# Patient Record
Sex: MALE | Race: WHITE | NOT HISPANIC OR LATINO | URBAN - METROPOLITAN AREA
[De-identification: names, ages, dates, MRNs, and addresses within clinical notes are randomized per-mention and may not be internally consistent; named-entity substitution may affect disease eponyms.]

---

## 2018-03-23 ENCOUNTER — ON CAMPUS - OUTPATIENT (OUTPATIENT)
Dept: URBAN - METROPOLITAN AREA HOSPITAL 28 | Facility: HOSPITAL | Age: 78
End: 2018-03-23

## 2018-03-23 DIAGNOSIS — K63.5 POLYP OF COLON: ICD-10-CM

## 2018-03-23 DIAGNOSIS — K57.90 DIVERTICULOSIS OF INTESTINE, PART UNSPECIFIED, WITHOUT PERFO: ICD-10-CM

## 2018-03-23 DIAGNOSIS — Z86.010 PERSONAL HISTORY OF COLONIC POLYPS: ICD-10-CM

## 2018-03-23 PROCEDURE — 45380 COLONOSCOPY AND BIOPSY: CPT | Mod: PT

## 2019-03-15 ENCOUNTER — OFFICE (OUTPATIENT)
Dept: URBAN - METROPOLITAN AREA CLINIC 75 | Facility: CLINIC | Age: 79
End: 2019-03-15

## 2019-03-15 VITALS
WEIGHT: 232 LBS | HEART RATE: 83 BPM | DIASTOLIC BLOOD PRESSURE: 78 MMHG | HEIGHT: 70 IN | SYSTOLIC BLOOD PRESSURE: 120 MMHG

## 2019-03-15 DIAGNOSIS — K21.9 GASTRO-ESOPHAGEAL REFLUX DISEASE WITHOUT ESOPHAGITIS: ICD-10-CM

## 2019-03-15 PROCEDURE — 99213 OFFICE O/P EST LOW 20 MIN: CPT | Performed by: INTERNAL MEDICINE

## 2024-11-07 ENCOUNTER — TELEPHONE (OUTPATIENT)
Dept: INTERNAL MEDICINE | Facility: CLINIC | Age: 84
End: 2024-11-07

## 2024-11-07 RX ORDER — LIDOCAINE AND PRILOCAINE 25; 25 MG/G; MG/G
CREAM TOPICAL AS NEEDED
Qty: 1 EACH | Refills: 3 | Status: SHIPPED | OUTPATIENT
Start: 2024-11-07

## 2024-11-07 NOTE — TELEPHONE ENCOUNTER
I will refill this. He has an appt w/ Pain Management on 11/11 and I would suggest that he discuss this with them further as well as this pain seems to be lingering longer than would typically be expected from Shingles.

## 2024-11-07 NOTE — TELEPHONE ENCOUNTER
Caller: Jose Reynoso    Relationship: Self    Best call back number: [phone]    What medication are you requesting: PATIENT WENT TO ER HAS SHINGLES WANTED TO SEE IF MAURY GONSALEZ WOULD CALL HIM IN THIS MEDICATION FOR THE PAIN FROM THE SHINGLES   lidocaine-prilocaine (EMLA) 2.5-2.5 % cream       PATIENT IS ABOUT TO RUN OUT OF CREAM AND NEEDS BEFORE THE WEEKEND     If a prescription is needed, what is your preferred pharmacy and phone number:  [facility] [place] - [address] [facility] [phone] [facility] [phone]

## 2024-12-11 ENCOUNTER — OFFICE VISIT (OUTPATIENT)
Dept: PAIN MEDICINE | Facility: CLINIC | Age: 84
End: 2024-12-11
Payer: MEDICARE

## 2024-12-11 VITALS
HEIGHT: 71 IN | WEIGHT: 207.8 LBS | BODY MASS INDEX: 29.09 KG/M2 | DIASTOLIC BLOOD PRESSURE: 80 MMHG | OXYGEN SATURATION: 95 % | HEART RATE: 71 BPM | SYSTOLIC BLOOD PRESSURE: 110 MMHG | TEMPERATURE: 95.7 F

## 2024-12-11 DIAGNOSIS — M25.551 RIGHT HIP PAIN: ICD-10-CM

## 2024-12-11 DIAGNOSIS — M96.1 POSTLAMINECTOMY SYNDROME, CERVICAL REGION: ICD-10-CM

## 2024-12-11 DIAGNOSIS — B02.29 POST HERPETIC NEURALGIA: ICD-10-CM

## 2024-12-11 DIAGNOSIS — M54.16 LUMBAR RADICULOPATHY: ICD-10-CM

## 2024-12-11 DIAGNOSIS — G89.3 CANCER RELATED PAIN: ICD-10-CM

## 2024-12-11 DIAGNOSIS — M96.1 POSTLAMINECTOMY SYNDROME, CERVICAL REGION: Primary | ICD-10-CM

## 2024-12-11 DIAGNOSIS — Z79.899 ENCOUNTER FOR LONG-TERM (CURRENT) USE OF HIGH-RISK MEDICATION: ICD-10-CM

## 2024-12-11 LAB
POC AMPHETAMINES: NEGATIVE
POC BARBITURATES: NEGATIVE
POC BENZODIAZEPHINES: NEGATIVE
POC COCAINE: NEGATIVE
POC METHADONE: NEGATIVE
POC METHAMPHETAMINE SCREEN URINE: NEGATIVE
POC OPIATES: NEGATIVE
POC OXYCODONE: NEGATIVE
POC PHENCYCLIDINE: NEGATIVE
POC PROPOXYPHENE: NEGATIVE
POC THC: NEGATIVE
POC TRICYCLIC ANTIDEPRESSANTS: NEGATIVE

## 2024-12-11 RX ORDER — OXYCODONE AND ACETAMINOPHEN 10; 325 MG/1; MG/1
1 TABLET ORAL EVERY 4 HOURS PRN
Qty: 180 TABLET | Refills: 0 | Status: SHIPPED | OUTPATIENT
Start: 2024-12-11
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
## 2025-01-13 ENCOUNTER — TRANSCRIBE ORDERS (OUTPATIENT)
Dept: SURGERY | Facility: SURGERY CENTER | Age: 85
End: 2025-01-13
Payer: MEDICARE

## 2025-01-13 ENCOUNTER — OFFICE VISIT (OUTPATIENT)
Dept: PAIN MEDICINE | Facility: CLINIC | Age: 85
End: 2025-01-13
Payer: MEDICARE

## 2025-01-13 ENCOUNTER — PREP FOR SURGERY (OUTPATIENT)
Dept: SURGERY | Facility: SURGERY CENTER | Age: 85
End: 2025-01-13
Payer: MEDICARE

## 2025-01-13 VITALS
DIASTOLIC BLOOD PRESSURE: 74 MMHG | HEIGHT: 71 IN | WEIGHT: 208 LBS | BODY MASS INDEX: 29.12 KG/M2 | OXYGEN SATURATION: 94 % | TEMPERATURE: 97.7 F | HEART RATE: 63 BPM | RESPIRATION RATE: 20 BRPM | SYSTOLIC BLOOD PRESSURE: 136 MMHG

## 2025-01-13 DIAGNOSIS — M19.049 ARTHRITIS PAIN OF HAND: ICD-10-CM

## 2025-01-13 DIAGNOSIS — G89.3 CANCER RELATED PAIN: ICD-10-CM

## 2025-01-13 DIAGNOSIS — G89.3 CANCER RELATED PAIN: Primary | ICD-10-CM

## 2025-01-13 DIAGNOSIS — M54.16 LUMBAR RADICULOPATHY: ICD-10-CM

## 2025-01-13 DIAGNOSIS — M96.1 POSTLAMINECTOMY SYNDROME, CERVICAL REGION: ICD-10-CM

## 2025-01-13 DIAGNOSIS — M79.2 NEUROPATHIC PAIN OF CHEST: ICD-10-CM

## 2025-01-13 DIAGNOSIS — Z79.899 ENCOUNTER FOR LONG-TERM (CURRENT) USE OF HIGH-RISK MEDICATION: ICD-10-CM

## 2025-01-13 DIAGNOSIS — B02.29 POST HERPETIC NEURALGIA: ICD-10-CM

## 2025-01-13 DIAGNOSIS — M25.551 RIGHT HIP PAIN: Primary | ICD-10-CM

## 2025-01-13 DIAGNOSIS — M25.551 RIGHT HIP PAIN: ICD-10-CM

## 2025-01-13 DIAGNOSIS — Z41.9 SURGERY, ELECTIVE: Primary | ICD-10-CM

## 2025-01-13 PROCEDURE — 1125F AMNT PAIN NOTED PAIN PRSNT: CPT | Performed by: NURSE PRACTITIONER

## 2025-01-13 PROCEDURE — 99214 OFFICE O/P EST MOD 30 MIN: CPT | Performed by: NURSE PRACTITIONER

## 2025-01-13 PROCEDURE — 1160F RVW MEDS BY RX/DR IN RCRD: CPT | Performed by: NURSE PRACTITIONER

## 2025-01-13 PROCEDURE — 1159F MED LIST DOCD IN RCRD: CPT | Performed by: NURSE PRACTITIONER

## 2025-01-13 RX ORDER — OXYCODONE AND ACETAMINOPHEN 10; 325 MG/1; MG/1
1 TABLET ORAL EVERY 4 HOURS PRN
Qty: 180 TABLET | Refills: 0 | Status: SHIPPED | OUTPATIENT
Start: 2025-01-13
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
## 2025-01-28 ENCOUNTER — HOSPITAL ENCOUNTER (OUTPATIENT)
Facility: SURGERY CENTER | Age: 85
Setting detail: HOSPITAL OUTPATIENT SURGERY
Discharge: HOME | End: 2025-01-28
Attending: ANESTHESIOLOGY | Admitting: ANESTHESIOLOGY
Payer: MEDICARE

## 2025-01-28 ENCOUNTER — HOSPITAL ENCOUNTER (OUTPATIENT)
Dept: GENERAL RADIOLOGY | Facility: SURGERY CENTER | Age: 85
Setting detail: HOSPITAL OUTPATIENT SURGERY
End: 2025-01-28
Payer: MEDICARE

## 2025-01-28 VITALS
RESPIRATION RATE: 16 BRPM | SYSTOLIC BLOOD PRESSURE: 117 MMHG | TEMPERATURE: 97.3 F | HEART RATE: 59 BPM | BODY MASS INDEX: 28.63 KG/M2 | OXYGEN SATURATION: 95 % | DIASTOLIC BLOOD PRESSURE: 74 MMHG | HEIGHT: 70 IN | WEIGHT: 200 LBS

## 2025-01-28 DIAGNOSIS — Z41.9 SURGERY, ELECTIVE: ICD-10-CM

## 2025-01-28 PROCEDURE — 25010000002 LIDOCAINE PF 1% 1 % SOLUTION 5 ML VIAL: Performed by: ANESTHESIOLOGY

## 2025-01-28 PROCEDURE — 77002 NEEDLE LOCALIZATION BY XRAY: CPT

## 2025-01-28 PROCEDURE — 25510000001 IOPAMIDOL 61 % SOLUTION 30 ML VIAL: Performed by: ANESTHESIOLOGY

## 2025-01-28 PROCEDURE — 25010000002 METHYLPREDNISOLONE PER 80 MG: Performed by: ANESTHESIOLOGY

## 2025-01-28 PROCEDURE — 20610 DRAIN/INJ JOINT/BURSA W/O US: CPT | Performed by: ANESTHESIOLOGY

## 2025-01-28 PROCEDURE — 25010000002 BUPIVACAINE (PF) 0.5 % SOLUTION 10 ML VIAL: Performed by: ANESTHESIOLOGY

## 2025-01-28 PROCEDURE — 77002 NEEDLE LOCALIZATION BY XRAY: CPT | Performed by: ANESTHESIOLOGY

## 2025-01-28 NOTE — OP NOTE
Right Hip injection - lateral approach  [facility]    PREOPERATIVE DIAGNOSIS:     right hip DJD    POSTOPERATIVE DIAGNOSIS:   same    PROCEDURE:  20610 - Right Intra-articular Hip Joint Injection, with fluoroscopic guidance & hip arthrogram - Lateral Approach    PRE-PROCEDURE DISCUSSION WITH PATIENT:    Risks and complications were discussed with the patient prior to starting the procedure (including but not limited to infection, bleeding, injury, paralysis, and death) and informed consent was obtained.      SURGEON:  Reid Jackson MD    REASON FOR PROCEDURE:    previous 100% relief x 3 months.  Repeat is reasonable    SEDATION:  no  ANESTHETIC AGENT:  Marcaine 0.5%  STEROID AGENT:   depomedrol 80mg    DESCRIPTON OF PROCEDURE:  After obtaining informed consent, IV access was obtained in the preoperative area.  The patient was transported to the operative suite and placed in lateral decubitus position with the affected hip up.  The hip and knee were flexed.  EKG, blood pressure, and pulse oximetry were monitored.  Any and all sedation given was administered by the RN under my direct supervision and guidance.   The hip area was prepped in a wide diameter with Chloraprep and draped in a sterile fashion.     A point just cephalad of the greater trochanter was identified and the point was anesthetized with subcutaneous 1% Lidocaine.  A  22-gauge spinal needle was inserted perpendicular to the skin and, under fluoroscopic guidance and strict aseptic technique, directed over the greater trochanter toward the head of the femur and to a point near the medial neck of the femur and just lateral to the head of the femur.  Aspiration was confirmed negative.  After confirming the position of the needle with fluoroscopy, 1 mL of Omnipaque was injected and after seeing appropriate spread into the joint a total of 4mL of injectate including the above listed local anesthetic and steroid was injected into the  joint.  Vital signs remained stable.  The onset of analgesia was noted.    ESTIMATED BLOOD LOSS:  minimal  SPECIMENS:  None    COMPLICATIONS:  no    TOLERANCE & DISCHARGE CONDITION:    The patient tolerated the procedure well.  The patient was transported to the recovery area without difficulties.  The patient was discharged to home under the care of a chaperone and in satisfactory condition.    PLAN OF CARE:  The patient was given our standard instruction sheet.  The patient will return for regular office visit in 2 weeks.   The patient is asked to keep a pain log sheet hourly for 8 hours today.  The patient will resume all medications as per the medication reconciliation sheet.

## 2025-01-28 NOTE — DISCHARGE INSTRUCTIONS
[facility] - Post-procedure Instructions          --  While there are no absolute restrictions, it is recommended that you do not perform strenuous activity today. In the morning, you may resume your level of activity as before your block.    --  If you have a band-aid at your injection site, please remove it later today. Observe the area for any redness, swelling, pus-like drainage, or a temperature over 101°. If any of these symptoms occur, please call your doctor at [phone]. If after office hours, leave a message and the on-call provider will return your call.    --  Ice may be applied to your injection site. It is recommended you avoid direct heat (heating pad; hot tub) for 1-2 days.    --  Call [facility] at [phone] if you experience persistent headache, persistent bleeding from the injection site, or severe pain not relieved by heat or oral medication.    --  Do not make important decisions today.    --  Due to the effects of the block and/or the I.V. Sedation, DO NOT drive or operate hazardous machinery for 12 hours.  Local anesthetics may cause numbness after procedure and precautions must be taken with regards to operating equipment as well as with walking, even if ambulating with assistance of another person or with an assistive device.    --  Do not drink alcohol for 12 hours.    -- You may return to work tomorrow, or as directed by your referring doctor.    --  Occasionally you may notice a slight increase in your pain after the procedure. This should start to improve within the next 24-48 hours. Radiofrequency ablation procedure pain may last 3-4 weeks.    --  It may take as long as 3-4 days before you notice a gradual improvement in your pain and/or other symptoms.    -- You may continue to take your prescribed pain medication as needed.    --  Some normal possible side effects of steroid use could include fluid retention, increased blood sugar, dull headache,  increased sweating, increased appetite, mood swings and flushing.    --  Diabetics are recommended to watch their blood glucose level closely for 24-48 hours after the injection.    --  Must stay in PACU for 20 min upon arrival and prove no leg weakness before being discharged.    --  IN THE EVENT OF A LIFE THREATENING EMERGENCY, (CHEST PAIN, BREATHING DIFFICULTIES, PARALYSIS…) YOU SHOULD GO TO YOUR NEAREST EMERGENCY ROOM.    --  You should be contacted by our office within 2-3 days to schedule follow up or next appointment date.  If not contacted within 7 days, please call the office at [phone]

## 2025-02-10 ENCOUNTER — OFFICE VISIT (OUTPATIENT)
Dept: PAIN MEDICINE | Facility: CLINIC | Age: 85
End: 2025-02-10
Payer: MEDICARE

## 2025-02-10 VITALS
OXYGEN SATURATION: 93 % | BODY MASS INDEX: 29.84 KG/M2 | TEMPERATURE: 96.3 F | DIASTOLIC BLOOD PRESSURE: 74 MMHG | RESPIRATION RATE: 20 BRPM | WEIGHT: 208.4 LBS | SYSTOLIC BLOOD PRESSURE: 119 MMHG | HEART RATE: 62 BPM | HEIGHT: 70 IN

## 2025-02-10 DIAGNOSIS — B02.29 POST HERPETIC NEURALGIA: ICD-10-CM

## 2025-02-10 DIAGNOSIS — G89.3 CANCER RELATED PAIN: ICD-10-CM

## 2025-02-10 DIAGNOSIS — Z79.899 ENCOUNTER FOR LONG-TERM (CURRENT) USE OF HIGH-RISK MEDICATION: ICD-10-CM

## 2025-02-10 DIAGNOSIS — M54.16 LUMBAR RADICULOPATHY: ICD-10-CM

## 2025-02-10 DIAGNOSIS — M96.1 POSTLAMINECTOMY SYNDROME, CERVICAL REGION: ICD-10-CM

## 2025-02-10 DIAGNOSIS — M25.551 RIGHT HIP PAIN: ICD-10-CM

## 2025-02-10 DIAGNOSIS — M96.1 POSTLAMINECTOMY SYNDROME, CERVICAL REGION: Primary | ICD-10-CM

## 2025-02-10 PROCEDURE — 1160F RVW MEDS BY RX/DR IN RCRD: CPT | Performed by: NURSE PRACTITIONER

## 2025-02-10 PROCEDURE — 1159F MED LIST DOCD IN RCRD: CPT | Performed by: NURSE PRACTITIONER

## 2025-02-10 PROCEDURE — 1125F AMNT PAIN NOTED PAIN PRSNT: CPT | Performed by: NURSE PRACTITIONER

## 2025-02-10 PROCEDURE — 99214 OFFICE O/P EST MOD 30 MIN: CPT | Performed by: NURSE PRACTITIONER

## 2025-02-10 RX ORDER — OXYCODONE AND ACETAMINOPHEN 10; 325 MG/1; MG/1
1 TABLET ORAL EVERY 4 HOURS PRN
Qty: 180 TABLET | Refills: 0 | Status: SHIPPED | OUTPATIENT
Start: 2025-02-10
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
## 2025-02-19 ENCOUNTER — APPOINTMENT (OUTPATIENT)
Dept: GENERAL RADIOLOGY | Facility: HOSPITAL | Age: 85
End: 2025-02-19
Payer: MEDICARE

## 2025-02-19 ENCOUNTER — ANESTHESIA (OUTPATIENT)
Dept: PERIOP | Facility: HOSPITAL | Age: 85
End: 2025-02-19
Payer: MEDICARE

## 2025-02-19 ENCOUNTER — HOSPITAL ENCOUNTER (OUTPATIENT)
Facility: HOSPITAL | Age: 85
Discharge: HOME HEALTH | End: 2025-02-21
Attending: STUDENT IN AN ORGANIZED HEALTH CARE EDUCATION/TRAINING PROGRAM | Admitting: HOSPITALIST
Payer: MEDICARE

## 2025-02-19 ENCOUNTER — ANESTHESIA EVENT (OUTPATIENT)
Dept: PERIOP | Facility: HOSPITAL | Age: 85
End: 2025-02-19
Payer: MEDICARE

## 2025-02-19 DIAGNOSIS — S82.851A CLOSED TRIMALLEOLAR FRACTURE OF RIGHT ANKLE, INITIAL ENCOUNTER: Primary | ICD-10-CM

## 2025-02-19 PROBLEM — S82.853A TRIMALLEOLAR FRACTURE: Status: ACTIVE | Noted: 2025-02-19

## 2025-02-19 LAB
ALBUMIN SERPL-MCNC: 4.4 G/DL (ref 3.5–5.2)
ALBUMIN/GLOB SERPL: 1.6 G/DL
ALP SERPL-CCNC: 62 U/L (ref 39–117)
ALT SERPL W P-5'-P-CCNC: 14 U/L (ref 1–41)
ANION GAP SERPL CALCULATED.3IONS-SCNC: 10.4 MMOL/L (ref 5–15)
AST SERPL-CCNC: 23 U/L (ref 1–40)
BASOPHILS # BLD AUTO: 0.02 10*3/MM3 (ref 0–0.2)
BASOPHILS NFR BLD AUTO: 0.3 % (ref 0–1.5)
BILIRUB SERPL-MCNC: 0.7 MG/DL (ref 0–1.2)
BUN SERPL-MCNC: 18 MG/DL (ref 8–23)
BUN/CREAT SERPL: 20.9 (ref 7–25)
CALCIUM SPEC-SCNC: 8.9 MG/DL (ref 8.6–10.5)
CHLORIDE SERPL-SCNC: 104 MMOL/L (ref 98–107)
CO2 SERPL-SCNC: 25.6 MMOL/L (ref 22–29)
CREAT SERPL-MCNC: 0.86 MG/DL (ref 0.76–1.27)
DEPRECATED RDW RBC AUTO: 41.7 FL (ref 37–54)
EGFRCR SERPLBLD CKD-EPI 2021: 86.5 ML/MIN/1.73
EOSINOPHIL # BLD AUTO: 0.14 10*3/MM3 (ref 0–0.4)
EOSINOPHIL NFR BLD AUTO: 1.9 % (ref 0.3–6.2)
ERYTHROCYTE [DISTWIDTH] IN BLOOD BY AUTOMATED COUNT: 11.6 % (ref 12.3–15.4)
GLOBULIN UR ELPH-MCNC: 2.8 GM/DL
GLUCOSE SERPL-MCNC: 104 MG/DL (ref 65–99)
HCT VFR BLD AUTO: 42.8 % (ref 37.5–51)
HGB BLD-MCNC: 13.8 G/DL (ref 13–17.7)
IMM GRANULOCYTES # BLD AUTO: 0.04 10*3/MM3 (ref 0–0.05)
IMM GRANULOCYTES NFR BLD AUTO: 0.5 % (ref 0–0.5)
INR PPP: 0.87 (ref 0.9–1.1)
LYMPHOCYTES # BLD AUTO: 1.06 10*3/MM3 (ref 0.7–3.1)
LYMPHOCYTES NFR BLD AUTO: 14.6 % (ref 19.6–45.3)
MCH RBC QN AUTO: 30.9 PG (ref 26.6–33)
MCHC RBC AUTO-ENTMCNC: 32.2 G/DL (ref 31.5–35.7)
MCV RBC AUTO: 95.7 FL (ref 79–97)
MONOCYTES # BLD AUTO: 0.73 10*3/MM3 (ref 0.1–0.9)
MONOCYTES NFR BLD AUTO: 10 % (ref 5–12)
NEUTROPHILS NFR BLD AUTO: 5.29 10*3/MM3 (ref 1.7–7)
NEUTROPHILS NFR BLD AUTO: 72.7 % (ref 42.7–76)
PLATELET # BLD AUTO: 199 10*3/MM3 (ref 140–450)
PMV BLD AUTO: 9.7 FL (ref 6–12)
POTASSIUM SERPL-SCNC: 4 MMOL/L (ref 3.5–5.2)
POTASSIUM SERPL-SCNC: 4.4 MMOL/L (ref 3.5–5.2)
PROT SERPL-MCNC: 7.2 G/DL (ref 6–8.5)
PROTHROMBIN TIME: 12.2 SECONDS (ref 12.1–15)
RBC # BLD AUTO: 4.47 10*6/MM3 (ref 4.14–5.8)
SODIUM SERPL-SCNC: 140 MMOL/L (ref 136–145)
WBC NRBC COR # BLD AUTO: 7.28 10*3/MM3 (ref 3.4–10.8)

## 2025-02-19 PROCEDURE — 84132 ASSAY OF SERUM POTASSIUM: CPT | Performed by: STUDENT IN AN ORGANIZED HEALTH CARE EDUCATION/TRAINING PROGRAM

## 2025-02-19 PROCEDURE — 63710000001 POVIDONE-IODINE 10 % SOLUTION 118 ML BOTTLE: Performed by: INTERNAL MEDICINE

## 2025-02-19 PROCEDURE — C1713 ANCHOR/SCREW BN/BN,TIS/BN: HCPCS | Performed by: INTERNAL MEDICINE

## 2025-02-19 PROCEDURE — 76000 FLUOROSCOPY <1 HR PHYS/QHP: CPT

## 2025-02-19 PROCEDURE — 36415 COLL VENOUS BLD VENIPUNCTURE: CPT

## 2025-02-19 PROCEDURE — 25010000002 LIDOCAINE 2% SOLUTION: Performed by: ANESTHESIOLOGY

## 2025-02-19 PROCEDURE — 80053 COMPREHEN METABOLIC PANEL: CPT | Performed by: STUDENT IN AN ORGANIZED HEALTH CARE EDUCATION/TRAINING PROGRAM

## 2025-02-19 PROCEDURE — 99222 1ST HOSP IP/OBS MODERATE 55: CPT | Performed by: HOSPITALIST

## 2025-02-19 PROCEDURE — 99285 EMERGENCY DEPT VISIT HI MDM: CPT | Performed by: STUDENT IN AN ORGANIZED HEALTH CARE EDUCATION/TRAINING PROGRAM

## 2025-02-19 PROCEDURE — 25010000002 BUPIVACAINE (PF) 0.5 % SOLUTION: Performed by: ANESTHESIOLOGY

## 2025-02-19 PROCEDURE — 93005 ELECTROCARDIOGRAM TRACING: CPT | Performed by: STUDENT IN AN ORGANIZED HEALTH CARE EDUCATION/TRAINING PROGRAM

## 2025-02-19 PROCEDURE — 25810000003 LACTATED RINGERS PER 1000 ML: Performed by: ANESTHESIOLOGY

## 2025-02-19 PROCEDURE — 85025 COMPLETE CBC W/AUTO DIFF WBC: CPT | Performed by: STUDENT IN AN ORGANIZED HEALTH CARE EDUCATION/TRAINING PROGRAM

## 2025-02-19 PROCEDURE — 27822 TREATMENT OF ANKLE FRACTURE: CPT | Performed by: INTERNAL MEDICINE

## 2025-02-19 PROCEDURE — 25010000002 CEFAZOLIN PER 500 MG: Performed by: INTERNAL MEDICINE

## 2025-02-19 PROCEDURE — 25010000002 HYDROMORPHONE 1 MG/ML SOLUTION: Performed by: STUDENT IN AN ORGANIZED HEALTH CARE EDUCATION/TRAINING PROGRAM

## 2025-02-19 PROCEDURE — 73610 X-RAY EXAM OF ANKLE: CPT

## 2025-02-19 PROCEDURE — 25010000002 PROPOFOL 200 MG/20ML EMULSION: Performed by: ANESTHESIOLOGY

## 2025-02-19 PROCEDURE — 25010000002 ONDANSETRON PER 1 MG: Performed by: ANESTHESIOLOGY

## 2025-02-19 PROCEDURE — 85610 PROTHROMBIN TIME: CPT | Performed by: STUDENT IN AN ORGANIZED HEALTH CARE EDUCATION/TRAINING PROGRAM

## 2025-02-19 PROCEDURE — 25010000002 FENTANYL CITRATE (PF) 50 MCG/ML SOLUTION: Performed by: ANESTHESIOLOGY

## 2025-02-19 PROCEDURE — 27829 TREAT LOWER LEG JOINT: CPT | Performed by: INTERNAL MEDICINE

## 2025-02-19 PROCEDURE — 93010 ELECTROCARDIOGRAM REPORT: CPT | Performed by: INTERNAL MEDICINE

## 2025-02-19 PROCEDURE — A9270 NON-COVERED ITEM OR SERVICE: HCPCS | Performed by: INTERNAL MEDICINE

## 2025-02-19 PROCEDURE — 99204 OFFICE O/P NEW MOD 45 MIN: CPT | Performed by: INTERNAL MEDICINE

## 2025-02-19 PROCEDURE — 96374 THER/PROPH/DIAG INJ IV PUSH: CPT

## 2025-02-19 DEVICE — IMPLANTABLE DEVICE
Type: IMPLANTABLE DEVICE | Site: ANKLE | Status: FUNCTIONAL
Brand: ZIPTIGHT FIXATION SYSTEM

## 2025-02-19 DEVICE — SCRW PERIART LK HD2.7MM 3.5X16MM: Type: IMPLANTABLE DEVICE | Site: ANKLE | Status: FUNCTIONAL

## 2025-02-19 DEVICE — SCRW ULS LK 2.7X16MM: Type: IMPLANTABLE DEVICE | Site: ANKLE | Status: FUNCTIONAL

## 2025-02-19 DEVICE — PLT FIB PERIART LK D/L 6H 106 RT: Type: IMPLANTABLE DEVICE | Site: ANKLE | Status: FUNCTIONAL

## 2025-02-19 DEVICE — MEDLINE BONEWAX YELLOW
Type: IMPLANTABLE DEVICE | Site: ANKLE | Status: FUNCTIONAL
Brand: MEDLINE BONEWAX YELLOW

## 2025-02-19 DEVICE — SCRW CORT PERIART LP S/TAP HD2.7  3.5X20: Type: IMPLANTABLE DEVICE | Site: ANKLE | Status: FUNCTIONAL

## 2025-02-19 DEVICE — SCRW ULS LK 2.7X18MM: Type: IMPLANTABLE DEVICE | Site: ANKLE | Status: FUNCTIONAL

## 2025-02-19 DEVICE — SCRW ULS LK PT S/TAP 2.7X14MM: Type: IMPLANTABLE DEVICE | Site: ANKLE | Status: FUNCTIONAL

## 2025-02-19 DEVICE — IMPLANTABLE DEVICE: Type: IMPLANTABLE DEVICE | Site: ANKLE | Status: FUNCTIONAL

## 2025-02-19 RX ORDER — HYDROCODONE BITARTRATE AND ACETAMINOPHEN 5; 325 MG/1; MG/1
1 TABLET ORAL EVERY 4 HOURS PRN
Status: DISCONTINUED | OUTPATIENT
Start: 2025-02-19 | End: 2025-02-20

## 2025-02-19 RX ORDER — FLUTICASONE PROPIONATE 50 MCG
2 SPRAY, SUSPENSION (ML) NASAL DAILY
Status: DISCONTINUED | OUTPATIENT
Start: 2025-02-19 | End: 2025-02-21 | Stop reason: HOSPADM

## 2025-02-19 RX ORDER — OXYCODONE HYDROCHLORIDE 5 MG/1
5 TABLET ORAL ONCE
Status: COMPLETED | OUTPATIENT
Start: 2025-02-19 | End: 2025-02-19

## 2025-02-19 RX ORDER — LIDOCAINE AND PRILOCAINE 25; 25 MG/G; MG/G
CREAM TOPICAL AS NEEDED
Status: DISCONTINUED | OUTPATIENT
Start: 2025-02-19 | End: 2025-02-21 | Stop reason: HOSPADM

## 2025-02-19 RX ORDER — ONDANSETRON 2 MG/ML
4 INJECTION INTRAMUSCULAR; INTRAVENOUS ONCE
Status: COMPLETED | OUTPATIENT
Start: 2025-02-19 | End: 2025-02-19

## 2025-02-19 RX ORDER — SODIUM CHLORIDE 0.9 % (FLUSH) 0.9 %
10 SYRINGE (ML) INJECTION AS NEEDED
Status: DISCONTINUED | OUTPATIENT
Start: 2025-02-19 | End: 2025-02-19 | Stop reason: HOSPADM

## 2025-02-19 RX ORDER — MIDAZOLAM HYDROCHLORIDE 2 MG/2ML
0.5 INJECTION, SOLUTION INTRAMUSCULAR; INTRAVENOUS
Status: DISCONTINUED | OUTPATIENT
Start: 2025-02-19 | End: 2025-02-19 | Stop reason: HOSPADM

## 2025-02-19 RX ORDER — TAMSULOSIN HYDROCHLORIDE 0.4 MG/1
0.4 CAPSULE ORAL NIGHTLY
Status: DISCONTINUED | OUTPATIENT
Start: 2025-02-19 | End: 2025-02-21 | Stop reason: HOSPADM

## 2025-02-19 RX ORDER — PROPOFOL 10 MG/ML
INJECTION, EMULSION INTRAVENOUS CONTINUOUS PRN
Status: DISCONTINUED | OUTPATIENT
Start: 2025-02-19 | End: 2025-02-19 | Stop reason: SURG

## 2025-02-19 RX ORDER — DEXMEDETOMIDINE HYDROCHLORIDE 100 UG/ML
INJECTION, SOLUTION INTRAVENOUS
Status: COMPLETED | OUTPATIENT
Start: 2025-02-19 | End: 2025-02-19

## 2025-02-19 RX ORDER — BUPIVACAINE HYDROCHLORIDE 5 MG/ML
INJECTION, SOLUTION EPIDURAL; INTRACAUDAL
Status: COMPLETED | OUTPATIENT
Start: 2025-02-19 | End: 2025-02-19

## 2025-02-19 RX ORDER — FINASTERIDE 5 MG/1
5 TABLET, FILM COATED ORAL DAILY
Status: DISCONTINUED | OUTPATIENT
Start: 2025-02-20 | End: 2025-02-21 | Stop reason: HOSPADM

## 2025-02-19 RX ORDER — FAMOTIDINE 10 MG/ML
20 INJECTION, SOLUTION INTRAVENOUS
Status: COMPLETED | OUTPATIENT
Start: 2025-02-19 | End: 2025-02-19

## 2025-02-19 RX ORDER — ONDANSETRON 2 MG/ML
4 INJECTION INTRAMUSCULAR; INTRAVENOUS ONCE AS NEEDED
Status: DISCONTINUED | OUTPATIENT
Start: 2025-02-19 | End: 2025-02-19 | Stop reason: HOSPADM

## 2025-02-19 RX ORDER — ACETAMINOPHEN 500 MG
1000 TABLET ORAL ONCE
Status: COMPLETED | OUTPATIENT
Start: 2025-02-19 | End: 2025-02-19

## 2025-02-19 RX ORDER — FENTANYL CITRATE 50 UG/ML
50 INJECTION, SOLUTION INTRAMUSCULAR; INTRAVENOUS
Status: DISCONTINUED | OUTPATIENT
Start: 2025-02-19 | End: 2025-02-19 | Stop reason: HOSPADM

## 2025-02-19 RX ORDER — ALBUTEROL SULFATE 0.83 MG/ML
1.25 SOLUTION RESPIRATORY (INHALATION) EVERY 4 HOURS PRN
Status: DISCONTINUED | OUTPATIENT
Start: 2025-02-19 | End: 2025-02-21 | Stop reason: HOSPADM

## 2025-02-19 RX ORDER — GUAIFENESIN 600 MG/1
600 TABLET, EXTENDED RELEASE ORAL 2 TIMES DAILY
Status: DISCONTINUED | OUTPATIENT
Start: 2025-02-19 | End: 2025-02-21 | Stop reason: HOSPADM

## 2025-02-19 RX ORDER — ASPIRIN 81 MG/1
81 TABLET ORAL EVERY 12 HOURS SCHEDULED
Status: DISCONTINUED | OUTPATIENT
Start: 2025-02-20 | End: 2025-02-21 | Stop reason: HOSPADM

## 2025-02-19 RX ORDER — ARFORMOTEROL TARTRATE 15 UG/2ML
15 SOLUTION RESPIRATORY (INHALATION)
Status: DISCONTINUED | OUTPATIENT
Start: 2025-02-19 | End: 2025-02-21 | Stop reason: HOSPADM

## 2025-02-19 RX ORDER — SODIUM CHLORIDE, SODIUM LACTATE, POTASSIUM CHLORIDE, CALCIUM CHLORIDE 600; 310; 30; 20 MG/100ML; MG/100ML; MG/100ML; MG/100ML
100 INJECTION, SOLUTION INTRAVENOUS ONCE
Status: COMPLETED | OUTPATIENT
Start: 2025-02-19 | End: 2025-02-19

## 2025-02-19 RX ORDER — HYDROCODONE BITARTRATE AND ACETAMINOPHEN 10; 325 MG/1; MG/1
1 TABLET ORAL EVERY 4 HOURS PRN
Status: DISCONTINUED | OUTPATIENT
Start: 2025-02-19 | End: 2025-02-20

## 2025-02-19 RX ORDER — PANTOPRAZOLE SODIUM 40 MG/1
40 TABLET, DELAYED RELEASE ORAL
Status: DISCONTINUED | OUTPATIENT
Start: 2025-02-20 | End: 2025-02-21 | Stop reason: HOSPADM

## 2025-02-19 RX ORDER — LIDOCAINE HYDROCHLORIDE 20 MG/ML
INJECTION, SOLUTION INFILTRATION; PERINEURAL AS NEEDED
Status: DISCONTINUED | OUTPATIENT
Start: 2025-02-19 | End: 2025-02-19 | Stop reason: SURG

## 2025-02-19 RX ORDER — FENTANYL CITRATE 50 UG/ML
25 INJECTION, SOLUTION INTRAMUSCULAR; INTRAVENOUS
Status: DISCONTINUED | OUTPATIENT
Start: 2025-02-19 | End: 2025-02-19 | Stop reason: HOSPADM

## 2025-02-19 RX ORDER — MAGNESIUM HYDROXIDE 1200 MG/15ML
LIQUID ORAL AS NEEDED
Status: DISCONTINUED | OUTPATIENT
Start: 2025-02-19 | End: 2025-02-19 | Stop reason: HOSPADM

## 2025-02-19 RX ORDER — EPHEDRINE SULFATE 50 MG/ML
INJECTION INTRAVENOUS AS NEEDED
Status: DISCONTINUED | OUTPATIENT
Start: 2025-02-19 | End: 2025-02-19 | Stop reason: SURG

## 2025-02-19 RX ORDER — FENTANYL CITRATE 50 UG/ML
25 INJECTION, SOLUTION INTRAMUSCULAR; INTRAVENOUS
Status: COMPLETED | OUTPATIENT
Start: 2025-02-19 | End: 2025-02-19

## 2025-02-19 RX ORDER — SODIUM CHLORIDE 9 MG/ML
40 INJECTION, SOLUTION INTRAVENOUS AS NEEDED
Status: DISCONTINUED | OUTPATIENT
Start: 2025-02-19 | End: 2025-02-19 | Stop reason: HOSPADM

## 2025-02-19 RX ORDER — MORPHINE SULFATE 2 MG/ML
2 INJECTION, SOLUTION INTRAMUSCULAR; INTRAVENOUS EVERY 4 HOURS PRN
Status: DISCONTINUED | OUTPATIENT
Start: 2025-02-19 | End: 2025-02-21

## 2025-02-19 RX ORDER — NALOXONE HCL 0.4 MG/ML
0.4 VIAL (ML) INJECTION
Status: DISCONTINUED | OUTPATIENT
Start: 2025-02-19 | End: 2025-02-21 | Stop reason: HOSPADM

## 2025-02-19 RX ORDER — SODIUM CHLORIDE 0.9 % (FLUSH) 0.9 %
10 SYRINGE (ML) INJECTION EVERY 12 HOURS SCHEDULED
Status: DISCONTINUED | OUTPATIENT
Start: 2025-02-19 | End: 2025-02-19 | Stop reason: HOSPADM

## 2025-02-19 RX ORDER — LIDOCAINE 4 G/G
1 PATCH TOPICAL EVERY 24 HOURS
Status: DISCONTINUED | OUTPATIENT
Start: 2025-02-19 | End: 2025-02-21 | Stop reason: HOSPADM

## 2025-02-19 RX ADMIN — FAMOTIDINE 20 MG: 10 INJECTION INTRAVENOUS at 19:34

## 2025-02-19 RX ADMIN — BUPIVACAINE HYDROCHLORIDE 17 ML: 5 INJECTION, SOLUTION EPIDURAL; INTRACAUDAL at 21:22

## 2025-02-19 RX ADMIN — EPHEDRINE SULFATE 10 MG: 50 INJECTION INTRAVENOUS at 22:17

## 2025-02-19 RX ADMIN — EPHEDRINE SULFATE 10 MG: 50 INJECTION INTRAVENOUS at 21:51

## 2025-02-19 RX ADMIN — ACETAMINOPHEN 1000 MG: 500 TABLET, FILM COATED ORAL at 15:38

## 2025-02-19 RX ADMIN — PROPOFOL 20 MG: 10 INJECTION, EMULSION INTRAVENOUS at 21:39

## 2025-02-19 RX ADMIN — DEXMEDETOMIDINE HYDROCHLORIDE 20 MCG: 100 INJECTION, SOLUTION INTRAVENOUS at 21:22

## 2025-02-19 RX ADMIN — ONDANSETRON 4 MG: 2 INJECTION INTRAMUSCULAR; INTRAVENOUS at 19:34

## 2025-02-19 RX ADMIN — LIDOCAINE HYDROCHLORIDE 60 MG: 20 INJECTION, SOLUTION INFILTRATION; PERINEURAL at 21:38

## 2025-02-19 RX ADMIN — DEXMEDETOMIDINE 20 MCG: 100 INJECTION, SOLUTION, CONCENTRATE INTRAVENOUS at 21:16

## 2025-02-19 RX ADMIN — FENTANYL CITRATE 25 MCG: 50 INJECTION, SOLUTION INTRAMUSCULAR; INTRAVENOUS at 20:12

## 2025-02-19 RX ADMIN — OXYCODONE HYDROCHLORIDE 5 MG: 5 TABLET ORAL at 15:38

## 2025-02-19 RX ADMIN — HYDROMORPHONE HYDROCHLORIDE 0.5 MG: 1 INJECTION, SOLUTION INTRAMUSCULAR; INTRAVENOUS; SUBCUTANEOUS at 16:57

## 2025-02-19 RX ADMIN — SODIUM CHLORIDE, POTASSIUM CHLORIDE, SODIUM LACTATE AND CALCIUM CHLORIDE: 600; 310; 30; 20 INJECTION, SOLUTION INTRAVENOUS at 21:31

## 2025-02-19 RX ADMIN — FENTANYL CITRATE 25 MCG: 50 INJECTION, SOLUTION INTRAMUSCULAR; INTRAVENOUS at 20:23

## 2025-02-19 RX ADMIN — BUPIVACAINE HYDROCHLORIDE 5 ML: 5 INJECTION, SOLUTION EPIDURAL; INTRACAUDAL; PERINEURAL at 21:16

## 2025-02-19 RX ADMIN — CEFAZOLIN 2000 MG: 2 INJECTION, POWDER, FOR SOLUTION INTRAVENOUS at 21:35

## 2025-02-19 RX ADMIN — PROPOFOL 60 MCG/KG/MIN: 10 INJECTION, EMULSION INTRAVENOUS at 21:38

## 2025-02-19 NOTE — OP NOTE
Date of Operation: 02/19/25        PREOPERATIVE DIAGNOSIS: right displaced trimalleolar ankle fracture dislocation     POSTOPERATIVE DIAGNOSIS:right displaced trimalleolar ankle fracture dislocation with syndesmotic disruption      PROCEDURE PERFORMED:  1.  Open treatment of trimalleolar ankle fracture includes internal fixation performed medial and/or lateral malleolus without fixation of posterior lip 78499.  2.  Open treatment of distal tibiofibular joint disruption includes internal fixation when performed [id number]     SURGEON: Ernie Ying MD     ASSISTANT:  Assistant: Ivan Denson CSA was responsible for performing the following activities: Retraction, Suction, Suturing, Closing, and Placing Dressing and their skilled assistance was necessary for the success of this case.      ANESTHESIA:general with block     ESTIMATED BLOOD LOSS: minimal     URINE OUTPUT: Not recorded.       FLUIDS: Per anesthesia.       COMPLICATIONS: None.       SPECIMENS: None.       DRAINS: None.       IMPLANTS: amie 6 hole lateral anatomic plate 2 50 mm amie partially-threaded cancellous screws, amie tightrope 1      INDICATIONS FOR PROCEDURE: The patient is a pleasant 84-year-old male who sustained a right displaced trimalleolar ankle fracture dislocation yesterday when he fell.  I discussed treatment options available to the patient including closed treatment versus open reduction, internal fixation, and patient wished to proceed with surgical treatment given displacement of the fracture site, as well as concerns for nonunion. I explained details of the procedure, as well as the risks, benefits, and alternatives as documented on history and physical, and the patient had all questions answered prior to signing the operative consent form. No guarantees were given in regard to results of the surgery.       DESCRIPTION OF PROCEDURE: The patient was seen, evaluated, and cleared for surgery by anesthesia. Admitted in the preoperative  holding area. The operative site was marked, consent was reviewed, history and physical was updated, and preoperative labs were reviewed. A regional block was then placed per anesthesia. The patient was then taken to the operating room and placed in a supine position on a regular OR table. After successful intubation per anesthesia, nonsterile tourniquet was applied to operative extremity.  All bony prominence is well-padded and patient was secured to the table with a waist strap. The left lower extremity was then sterilely prepped and draped in a standard fashion.       A formal timeout was completed, including confirmation of History and Physical, operative consent, surgical site, patient identification number, and preoperative antibiotic administration. The left lower extremity was exsanguinated using an Esmarch and the tourniquet was inflated to 250 mmHG.       The procedure was begun with a 3 cm incision centered over the medial malleolus.  The skin was incised with a 15 blade and then blunt dissection was carried down to the fracture with careful attention to avoid the saphenous vein.  The fracture was identified the ends were curetted and the periosteum was debrided out of the fracture site.  The joint was irrigated.  The fracture was held in place with a dental pick and 2 K wires were then placed in a retrograde fashion from the tip of the medial malleolus into the tibial metaphysis.  Two 4.0 mm partially-threaded cancellous screws were then placed over the wires and sequentially tightened.     We then turned our attention to the lateral aspect of the left distal fibula with a 8 cm centered over the fracture site with a #15 blade. Careful dissection was carried through the subcutaneous tissue with Metzenbaum scissors until the periosteal layer of the fibula was identified at this point in time.  The fibula fracture site was identified at this point time and tentative reduction obtained with pointed reduction  and lobster-claw clamps.   A 6-hole anatomic distal fibula locking plate was chosen and applied to the lateral aspect of the distal fibula in a bridging technique, with position confirmed to be appropriate under C-arm fluoroscopy as well as acceptable reduction of the fracture. One cortical screw was placed in bicortical fashion proximal to the fracture site. The plate was clamped to the distal portion of the distal fibula, and an additional cortical screw was placed distal to the fracture site. Additional locking screws were placed both proximal and distal to the fracture site at this point in time.  Reduction and stability at the fracture site were once again assessed under direct visualization as well as under stress with gentle range of motion of ankle. Final fluoroscopic images were taken at this point in time, confirming acceptable reduction and placement of hardware. Stress examination of syndesmosis under fluoroscopy was also completed this point in time with evidence of widening of syndesmosis.  The syndesmosis was clamped with a large periarticular clamp and a medline syndex tight rope was opened on the back table and the K wire shuttle was passed through the plate through the fibula into the anterior medial tibia.  The tight rope was then shuttled through using the K wire shuttle and then tightned.  The periarticular clamp was then removed.  During fracture fixation was noted that there was a posterior lip fracture that was considerably less than 25% of the articular surface and because of this we elected to treat it nonoperatively.  The tourniquet was then deflated and hemostasis was obtained and confirmed.  The wound was then copiously irrigated with normal saline.     Attention was then turned to closure of the wound with 2-0 monocryl for subcutaneous closure, 3-0 nylon horizontal mattresses for skin closure. The wounds were dressed with zeroform, 4 x 4 gauze, ABD pad, web roll 4 x 30 and 3 x 35  splint, and Ace wrap.        At the end of the procedure, all lap, needle, and sponge counts were correct x2. The patient had brisk capillary refill to all digits of the left lower extremity. Compartments were soft and easily compressible at the end of the procedure.       DISPOSITION: The patient was extubated per anesthesia and taken to the recovery room in stable condition. Patient will be sent back to the floor for observation overnight, plan for discharge today.  Patient will be nonweightbearing on operative extremity until follow-up visit, dressing and cam boot to remain in place until follow-up visit. Will follow up in office in 2 weeks for wound check. Results discussed immediately after procedure with family and all questions were answered postoperatively

## 2025-02-19 NOTE — H&P
"[facility] HOSPITALIST     Veronica Jerome, YAMILE    CHIEF COMPLAINT: Fall with ankle fracture    HISTORY OF PRESENT ILLNESS:    Mr. Reynoso is a very pleasant, 84-year-old gentleman who presents after sustaining a fall earlier today.  He reports he was going down to feed the birds when he slipped and went down on his right leg.  He said he heard a \"pop\" and a \"crack\".  He was unable to get back up and actually crawled to the snow, and into his garage where he banged on the door to get his wife's attention.  His wife looked out the window, and could not see him.  He then called for her at which point she opened the door and saw him on the ground.  EMS was summoned.  He reports he can go up and down 2 flights of stairs without any chest pain or dyspnea.      Past Medical History:   Diagnosis Date    Cancer     Cervical disc disorder     Chronic pain disorder     Neck, back, chest, legs    Colon polyp     COPD (chronic obstructive pulmonary disease)     Extremity pain     Fractures 1983    Back    GERD (gastroesophageal reflux disease)     Headache     Injury of back     Joint pain Hip    Low back pain     Lumbosacral disc disease     Neck pain     Peripheral neuropathy     Shingles      Past Surgical History:   Procedure Laterality Date    APPENDECTOMY      CERVICAL FUSION      CHOLECYSTECTOMY      COLONOSCOPY N/A 03/23/2018    Procedure: COLONOSCOPY with polypectomy;  Surgeon: Ja Lopez MD;  Location: [facility] OR;  Service: Gastroenterology    COLONOSCOPY W/ POLYPECTOMY N/A 05/01/2023    Procedure: COLONOSCOPY WITH POLYPECTOMY;  Surgeon: Ja Lopez MD;  Location: [facility] OR;  Service: Gastroenterology;  Laterality: N/A;  Diverticulosis; Sigmoid polyp x 4; Rectal polyp x 2    ENDOSCOPY N/A 07/05/2019    Procedure: ESOPHAGOGASTRODUODENOSCOPY w/ biopsies;  Surgeon: Ja Lopez MD;  Location: [facility] OR;  Service: Gastroenterology    EPIDURAL BLOCK      " LAMINECTOMY      NECK SURGERY      SPINAL FUSION      SPINE SURGERY      STEROID INJECTION HIP Right 2024    Procedure: right hip injection ;  Surgeon: Reid Jackson MD;  Location: [facility];  Service: Pain Management;  Laterality: Right;    STEROID INJECTION HIP Right 2025    Procedure: right hip injection ;  Surgeon: Reid Jackson MD;  Location: [facility];  Service: Pain Management;  Laterality: Right;    THORACIC EPIDURAL N/A 2024    Procedure: thoracic epidural steroid injection at approximately T7/8 50746;  Surgeon: Reid Jackson MD;  Location: [facility];  Service: Pain Management;  Laterality: N/A;     Family History   Problem Relation Age of Onset    Colon cancer Neg Hx     Colon polyps Neg Hx      Social History     Tobacco Use    Smoking status: Former     Current packs/day: 0.00     Average packs/day: 1 pack/day for 36.0 years (36.0 ttl pk-yrs)     Types: Cigarettes     Start date: 1951     Quit date: 1987     Years since quittin.1     Passive exposure: Past    Smokeless tobacco: Never   Vaping Use    Vaping status: Never Used   Substance Use Topics    Alcohol use: No    Drug use: No     Medications Prior to Admission   Medication Sig Dispense Refill Last Dose/Taking    albuterol (ACCUNEB) 1.25 MG/3ML nebulizer solution Take 3 mL by nebulization Every 4 (Four) Hours As Needed for Wheezing.   2025    alfuzosin (UROXATRAL) 10 MG 24 hr tablet Take 1 tablet by mouth Daily.   2025    Calcium-Phosphorus-Vitamin D (CITRACAL +D3 PO) Take  by mouth.   2025    finasteride (PROSCAR) 5 MG tablet Take 1 tablet by mouth Daily.   2025    guaiFENesin (MUCINEX) 600 MG 12 hr tablet Take 1 tablet by mouth 2 (Two) Times a Day.   2025    lidocaine (LIDODERM) 5 % Place 1 patch on the skin as directed by provider Daily. Remove & Discard patch within 12 hours or as directed by MD   Past Week    lidocaine-prilocaine (EMLA) 2.5-2.5 % cream  "Apply  topically to the appropriate area as directed As Needed for Mild Pain. 1 each 3 2/18/2025    Magnesium 300 MG capsule Take 250 mg by mouth 2 (Two) Times a Day.   2/19/2025    omeprazole (priLOSEC) 40 MG capsule 2 (Two) Times a Day.   2/19/2025    oxyCODONE-acetaminophen (PERCOCET)  MG per tablet Take 1 tablet by mouth Every 4 (Four) Hours As Needed for Moderate Pain. Fill date 2/13/2025 180 tablet 0 Past Week    polyethylene glycol (MIRALAX) 17 GM/SCOOP powder Take 17 g by mouth Daily. 238 g 0 2/19/2025    tiotropium bromide-olodaterol (STIOLTO RESPIMAT) 2.5-2.5 MCG/ACT aerosol solution inhaler Inhale 2 puffs Daily.   2/19/2025    ALBUTEROL IN Inhale.       clotrimazole-betamethasone (LOTRISONE) 1-0.05 % cream Apply 1 application topically to the appropriate area as directed 2 (Two) Times a Day. 45 g 0     fluticasone (FLONASE) 50 MCG/ACT nasal spray 2 sprays into the nostril(s) as directed by provider Daily. 15 mL 11 More than a month    mupirocin (BACTROBAN) 2 % ointment        naloxone (NARCAN) 4 MG/0.1ML nasal spray 1 spray into the nostril(s) as directed by provider As Needed (in the event of respiratory depression, use as soon as possible then call 911). 2 each 1      Allergies:  Penicillins, Adhesive tape, and Morphine    REVIEW OF SYSTEMS:  Please see the above history of present illness for pertinent positives and negatives.  The remainder of the patient's systems have been reviewed and are negative.    Vital Signs  Temp:  [98 °F (36.7 °C)] 98 °F (36.7 °C)  Heart Rate:  [59-70] 60  Resp:  [18] 18  BP: (121)/(86) 121/86  Oxygen Therapy  SpO2: 96 %  Pulse Oximetry Type: Intermittent  Device (Oxygen Therapy): room air}  Body mass index is 31.57 kg/m².  Flowsheet Rows      Flowsheet Row First Filed Value   Admission Height 177.8 cm (70\") Documented at 02/19/2025 1509   Admission Weight 99.8 kg (220 lb) Documented at 02/19/2025 1509               Physical Exam:  Physical Exam   Constitutional: " Patient appears well developed and well nourished and in no acute distress.  Appears younger than stated age.   HEENT:   Head: Normocephalic and atraumatic.   Eyes:  Pupils are equal, round, and EOM are intact. Sclerae are anicteric and noninjected.  Mouth and Throat: Patient has moist mucous membranes. Oropharynx is clear of any erythema or exudate.     Cardiovascular: Regular rate, regular rhythm, S1 normal and S2 normal.  Exam reveals no gallop and no friction rub.  No murmur heard.  Pulmonary/Chest: Lungs are clear to auscultation bilaterally. No respiratory distress. No wheezes. No rhonchi. No rales.   Abdominal: Soft. Bowel sounds are normal. No distension and no mass. There is no tenderness.   Musculoskeletal: Normal muscle tone  Extremities: No edema.   Neurological: Patient is alert and oriented to person, place, and time. Cranial nerves II-XII are grossly intact with no focal deficits.  Skin: Skin is warm. No rash noted. Nails show no clubbing.  No cyanosis or erythema.    Emotional Behavior:    Judgment and Insight: Normal   Mental Status:  Alertness  Normal   Memory:  Intact   Mood and Affect:         Depression  None               Anxiety  None    Debilities:   Physical Weakness  None   Handicaps  None   Disabilities  None   Agitation  None     Results Review:    I reviewed the patient's new clinical results.  Lab Results (most recent)       Procedure Component Value Units Date/Time    Potassium [[id number]]  (Normal) Collected: 02/19/25 1749    Specimen: Blood Updated: 02/19/25 1805     Potassium 4.0 mmol/L     Comprehensive Metabolic Panel [[id number]]  (Abnormal) Collected: 02/19/25 1657    Specimen: Blood Updated: 02/19/25 1722     Glucose 104 mg/dL      BUN 18 mg/dL      Creatinine 0.86 mg/dL      Sodium 140 mmol/L      Potassium 4.4 mmol/L      Comment: Slight hemolysis detected by analyzer. Result may be falsely elevated.        Chloride 104 mmol/L      CO2 25.6 mmol/L      Calcium 8.9 mg/dL       Total Protein 7.2 g/dL      Albumin 4.4 g/dL      ALT (SGPT) 14 U/L      AST (SGOT) 23 U/L      Alkaline Phosphatase 62 U/L      Total Bilirubin 0.7 mg/dL      Globulin 2.8 gm/dL      A/G Ratio 1.6 g/dL      BUN/Creatinine Ratio 20.9     Anion Gap 10.4 mmol/L      eGFR 86.5 mL/min/1.73     Narrative:      GFR Categories in Chronic Kidney Disease (CKD)      GFR Category          GFR (mL/min/1.73)    Interpretation  G1                     90 or greater         Normal or high (1)  G2                      60-89                Mild decrease (1)  G3a                   45-59                Mild to moderate decrease  G3b                   30-44                Moderate to severe decrease  G4                    15-29                Severe decrease  G5                    14 or less           Kidney failure          (1)In the absence of evidence of kidney disease, neither GFR category G1 or G2 fulfill the criteria for CKD.    eGFR calculation 2021 CKD-EPI creatinine equation, which does not include race as a factor    Protime-INR [[id number]]  (Abnormal) Collected: 02/19/25 1657    Specimen: Blood Updated: 02/19/25 1722     Protime 12.2 Seconds      INR 0.87    Narrative:      Therapeutic Ranges for INR: 2.0-3.0 (PT 20-30)                              2.5-3.5 (PT 25-34)    CBC & Differential [[id number]]  (Abnormal) Collected: 02/19/25 1657    Specimen: Blood Updated: 02/19/25 1705    Narrative:      The following orders were created for panel order CBC & Differential.  Procedure                               Abnormality         Status                     ---------                               -----------         ------                     CBC Auto Differential[[id number]]        Abnormal            Final result                 Please view results for these tests on the individual orders.    CBC Auto Differential [[id number]]  (Abnormal) Collected: 02/19/25 1657    Specimen: Blood Updated: 02/19/25 1705     WBC 7.28 10*3/mm3       RBC 4.47 10*6/mm3      Hemoglobin 13.8 g/dL      Hematocrit 42.8 %      MCV 95.7 fL      MCH 30.9 pg      MCHC 32.2 g/dL      RDW 11.6 %      RDW-SD 41.7 fl      MPV 9.7 fL      Platelets 199 10*3/mm3      Neutrophil % 72.7 %      Lymphocyte % 14.6 %      Monocyte % 10.0 %      Eosinophil % 1.9 %      Basophil % 0.3 %      Immature Grans % 0.5 %      Neutrophils, Absolute 5.29 10*3/mm3      Lymphocytes, Absolute 1.06 10*3/mm3      Monocytes, Absolute 0.73 10*3/mm3      Eosinophils, Absolute 0.14 10*3/mm3      Basophils, Absolute 0.02 10*3/mm3      Immature Grans, Absolute 0.04 10*3/mm3             Imaging Results (Most Recent)       Procedure Component Value Units Date/Time    XR Ankle 3+ View Right [[id number]] Collected: 02/19/25 1623     Updated: 02/19/25 1628    Narrative:      XR ANKLE 3+ VW RIGHT    Date of Exam: 2/19/2025 4:20 PM EST    Indication: ankle injury    Comparison: None available.    Findings:  There is an oblique fracture involving the distal fibula extending nearly to the ankle joint with lateral angulation of the distal fracture fragment and up to one shaft width lateral displacement distally. There is a transverse fracture involving the   base of the medial malleolus with 1 cm lateral displacement of the distal fracture fragment. There appears to be a posterior malleolar fracture as well. There is lateral posterior subluxation of the talar dome in relation to the tibial plafond. There is   associated soft tissue swelling/deformity. There is mild dorsal midfoot degenerative arthrosis.      Impression:      Impression:  1.Trimalleolar fracture subluxation.      Electronically Signed: Davonte Arechiga MD    2/19/2025 4:25 PM EST    Workstation ID: [id number]          reviewed    ECG/EMG Results (most recent)       Procedure Component Value Units Date/Time    ECG 12 Lead Pre-Op / Pre-Procedure [[id number]] Collected: 02/19/25 1643     Updated: 02/19/25 1645     QT Interval 423 ms      QTC Interval 420 ms      Narrative:      HEART RATE=59  bpm  RR Feyltopj=7416  ms  ID Xnvndgow=439  ms  P Horizontal Axis=3  deg  P Front Axis=32  deg  QRSD Tgyuskdh=041  ms  QT Vnsmrjvl=983  ms  SQqE=415  ms  QRS Axis=64  deg  T Wave Axis=61  deg  - OTHERWISE NORMAL ECG -  Sinus rhythm  Minimal ST elevation, anterior leads  Date and Time of Study:2025-02-19 16:43:49          Reviewed and compared to tracing from 5/2023 w/o dynamic change.    Assessment & Plan     Trimalleolar fracture of the right lower extremity: Discussed with Ortho and plan for OR this evening.  As noted, serial EKGs are without dynamic change and I reviewed his echo from May 2019 with preserved.  Given that he crawled up a hill, and is without chest pain, suspect he can perform at least 6-10 metabolic equivalents of activity without chest pain.  No further testing required.  COPD: Will continue aggressive pulmonary toilet postoperatively.  He is not in acute exacerbation.  GERD: No acute issues.  Continue PPI therapy.    I discussed the patient's findings and my recommendations with patient.     Junaid Green MD  02/19/25  18:43 EST

## 2025-02-19 NOTE — ED NOTES
Nursing report ED to floor  Jose Reynoso  84 y.o.  male    HPI :   Chief Complaint   Patient presents with    Ankle Injury     Right ankle injury-slipped and fell in the snow        Admitting doctor:   Junaid Green MD    Admitting diagnosis:   The encounter diagnosis was Closed trimalleolar fracture of right ankle, initial encounter.    Code status:   Current Code Status       Date Active Code Status Order ID Comments User Context       Prior            Allergies:   Penicillins, Adhesive tape, and Morphine    Isolation:   No active isolations    Intake and Output  No intake or output data in the 24 hours ending 02/19/25 1748    Weight:       02/19/25  1509   Weight: 99.8 kg (220 lb)       Most recent vitals:   Vitals:    02/19/25 1530 02/19/25 1545 02/19/25 1600 02/19/25 1630   BP:       Pulse: 63 64 59 60   Resp:       Temp:       TempSrc:       SpO2: 97% 97% 97% 96%   Weight:       Height:           Active LDAs/IV Access:   Lines, Drains & Airways       Active LDAs       Name Placement date Placement time Site Days    Peripheral IV 02/19/25 1657 Anterior;Distal;Left;Upper Arm 02/19/25  1657  Arm  less than 1                    Labs (abnormal labs have a star):   Labs Reviewed   COMPREHENSIVE METABOLIC PANEL - Abnormal; Notable for the following components:       Result Value    Glucose 104 (*)     All other components within normal limits    Narrative:     GFR Categories in Chronic Kidney Disease (CKD)      GFR Category          GFR (mL/min/1.73)    Interpretation  G1                     90 or greater         Normal or high (1)  G2                      60-89                Mild decrease (1)  G3a                   45-59                Mild to moderate decrease  G3b                   30-44                Moderate to severe decrease  G4                    15-29                Severe decrease  G5                    14 or less           Kidney failure          (1)In the absence of evidence of kidney disease,  neither GFR category G1 or G2 fulfill the criteria for CKD.    eGFR calculation 2021 CKD-EPI creatinine equation, which does not include race as a factor   PROTIME-INR - Abnormal; Notable for the following components:    INR 0.87 (*)     All other components within normal limits    Narrative:     Therapeutic Ranges for INR: 2.0-3.0 (PT 20-30)                              2.5-3.5 (PT 25-34)   CBC WITH AUTO DIFFERENTIAL - Abnormal; Notable for the following components:    RDW 11.6 (*)     Lymphocyte % 14.6 (*)     All other components within normal limits   POTASSIUM   CBC AND DIFFERENTIAL    Narrative:     The following orders were created for panel order CBC & Differential.  Procedure                               Abnormality         Status                     ---------                               -----------         ------                     CBC Auto Differential[[id number]]        Abnormal            Final result                 Please view results for these tests on the individual orders.       Results       Procedure Component Value Ref Range Date/Time    Potassium [[id number]]     Order Status: Sent Specimen: Blood     Comprehensive Metabolic Panel [[id number]]  (Abnormal) Collected: 02/19/25 1657    Order Status: Completed Specimen: Blood Updated: 02/19/25 1722     Glucose 104 65 - 99 mg/dL      BUN 18 8 - 23 mg/dL      Creatinine 0.86 0.76 - 1.27 mg/dL      Sodium 140 136 - 145 mmol/L      Potassium 4.4 3.5 - 5.2 mmol/L      Comment: Slight hemolysis detected by analyzer. Result may be falsely elevated.        Chloride 104 98 - 107 mmol/L      CO2 25.6 22.0 - 29.0 mmol/L      Calcium 8.9 8.6 - 10.5 mg/dL      Total Protein 7.2 6.0 - 8.5 g/dL      Albumin 4.4 3.5 - 5.2 g/dL      ALT (SGPT) 14 1 - 41 U/L      AST (SGOT) 23 1 - 40 U/L      Alkaline Phosphatase 62 39 - 117 U/L      Total Bilirubin 0.7 0.0 - 1.2 mg/dL      Globulin 2.8 gm/dL      A/G Ratio 1.6 g/dL      BUN/Creatinine Ratio 20.9 7.0 - 25.0       Anion Gap 10.4 5.0 - 15.0 mmol/L      eGFR 86.5 >60.0 mL/min/1.73     Narrative:      GFR Categories in Chronic Kidney Disease (CKD)      GFR Category          GFR (mL/min/1.73)    Interpretation  G1                     90 or greater         Normal or high (1)  G2                      60-89                Mild decrease (1)  G3a                   45-59                Mild to moderate decrease  G3b                   30-44                Moderate to severe decrease  G4                    15-29                Severe decrease  G5                    14 or less           Kidney failure          (1)In the absence of evidence of kidney disease, neither GFR category G1 or G2 fulfill the criteria for CKD.    eGFR calculation 2021 CKD-EPI creatinine equation, which does not include race as a factor    Protime-INR [[id number]]  (Abnormal) Collected: 02/19/25 1657    Order Status: Completed Specimen: Blood Updated: 02/19/25 1722     Protime 12.2 12.1 - 15.0 Seconds      INR 0.87 0.90 - 1.10     Narrative:      Therapeutic Ranges for INR: 2.0-3.0 (PT 20-30)                              2.5-3.5 (PT 25-34)    CBC Auto Differential [[id number]]  (Abnormal) Collected: 02/19/25 1657    Order Status: Completed Specimen: Blood Updated: 02/19/25 1705     WBC 7.28 3.40 - 10.80 10*3/mm3      RBC 4.47 4.14 - 5.80 10*6/mm3      Hemoglobin 13.8 13.0 - 17.7 g/dL      Hematocrit 42.8 37.5 - 51.0 %      MCV 95.7 79.0 - 97.0 fL      MCH 30.9 26.6 - 33.0 pg      MCHC 32.2 31.5 - 35.7 g/dL      RDW 11.6 12.3 - 15.4 %      RDW-SD 41.7 37.0 - 54.0 fl      MPV 9.7 6.0 - 12.0 fL      Platelets 199 140 - 450 10*3/mm3      Neutrophil % 72.7 42.7 - 76.0 %      Lymphocyte % 14.6 19.6 - 45.3 %      Monocyte % 10.0 5.0 - 12.0 %      Eosinophil % 1.9 0.3 - 6.2 %      Basophil % 0.3 0.0 - 1.5 %      Immature Grans % 0.5 0.0 - 0.5 %      Neutrophils, Absolute 5.29 1.70 - 7.00 10*3/mm3      Lymphocytes, Absolute 1.06 0.70 - 3.10 10*3/mm3      Monocytes, Absolute  0.73 0.10 - 0.90 10*3/mm3      Eosinophils, Absolute 0.14 0.00 - 0.40 10*3/mm3      Basophils, Absolute 0.02 0.00 - 0.20 10*3/mm3      Immature Grans, Absolute 0.04 0.00 - 0.05 10*3/mm3              EKG:   ECG 12 Lead Pre-Op / Pre-Procedure   Preliminary Result   HEART RATE=59  bpm   RR Cxmmtfss=4101  ms   AL Lmdjtqjs=503  ms   P Horizontal Axis=3  deg   P Front Axis=32  deg   QRSD Gbnftstx=280  ms   QT Fueofayi=099  ms   LFbU=951  ms   QRS Axis=64  deg   T Wave Axis=61  deg   - OTHERWISE NORMAL ECG -   Sinus rhythm   Minimal ST elevation, anterior leads   Date and Time of Study:2025 16:43:49          Meds given in ED:   Medications   oxyCODONE (ROXICODONE) immediate release tablet 5 mg (5 mg Oral Given 25 321)   acetaminophen (TYLENOL) tablet 1,000 mg (1,000 mg Oral Given 25 038)   HYDROmorphone (DILAUDID) injection 0.5 mg (0.5 mg Intravenous Given 25 6097)       Imaging results:  XR Ankle 3+ View Right    Result Date: 2025  Impression: 1.Trimalleolar fracture subluxation. Electronically Signed: Davonte Arechiga MD  2025 4:25 PM EST  Workstation ID: [id number]     Ambulatory status:   - stretcher    Social issues:   Social History     Socioeconomic History    Marital status:    Tobacco Use    Smoking status: Former     Current packs/day: 0.00     Average packs/day: 1 pack/day for 36.0 years (36.0 ttl pk-yrs)     Types: Cigarettes     Start date: 1951     Quit date: 1987     Years since quittin.1     Passive exposure: Past    Smokeless tobacco: Never   Vaping Use    Vaping status: Never Used   Substance and Sexual Activity    Alcohol use: No    Drug use: No    Sexual activity: Not Currently     Partners: Female       NIH Stroke Scale:           25 17:48 EST

## 2025-02-19 NOTE — ED PROVIDER NOTES
EMERGENCY ROOM NOTE  [facility]    SUBJECTIVE    Ankle Injury (Right ankle injury-slipped and fell in the snow )      Jose Reynoso is a 84 y.o. male with a past medication history of COPD with chronic respiratory failure on 2 L of oxygen, GERD and chronic pain presenting to the ER with traumatic right ankle pain.  Patient states he was walking down an icy hill and slipped, states his right ankle hyper extended and he landed on his ankle.  He was unable to ambulate after.  Has noted swelling in his right ankle as well as tingling and numbness in his toes.  He did not hit his head.  He landed on his bottom.  He is not on any blood thinners.  Denies pain elsewhere.      A 10-point review of systems was obtained and otherwise negative unless stated in the HPI    Past Medical History:   Diagnosis Date    Cancer     Cervical disc disorder     Chronic pain disorder     Neck, back, chest, legs    Colon polyp     COPD (chronic obstructive pulmonary disease)     Extremity pain     Fractures 1983    Back    GERD (gastroesophageal reflux disease)     Headache     Injury of back     Joint pain Hip    Low back pain     Lumbosacral disc disease     Neck pain     Peripheral neuropathy     Shingles        Allergies   Allergen Reactions    Penicillins Anaphylaxis    Adhesive Tape Itching    Morphine Nausea And Vomiting       Past Surgical History:   Procedure Laterality Date    APPENDECTOMY      CERVICAL FUSION      CHOLECYSTECTOMY      COLONOSCOPY N/A 03/23/2018    Procedure: COLONOSCOPY with polypectomy;  Surgeon: Ja Lopez MD;  Location: [facility] OR;  Service: Gastroenterology    COLONOSCOPY W/ POLYPECTOMY N/A 05/01/2023    Procedure: COLONOSCOPY WITH POLYPECTOMY;  Surgeon: Ja Lopez MD;  Location: [facility] OR;  Service: Gastroenterology;  Laterality: N/A;  Diverticulosis; Sigmoid polyp x 4; Rectal polyp x 2    ENDOSCOPY N/A 07/05/2019    Procedure: ESOPHAGOGASTRODUODENOSCOPY w/  biopsies;  Surgeon: Ja Lopez MD;  Location: [facility] OR;  Service: Gastroenterology    EPIDURAL BLOCK      LAMINECTOMY      NECK SURGERY      SPINAL FUSION      SPINE SURGERY      STEROID INJECTION HIP Right 2024    Procedure: right hip injection ;  Surgeon: Reid Jackson MD;  Location: [facility] OR;  Service: Pain Management;  Laterality: Right;    STEROID INJECTION HIP Right 2025    Procedure: right hip injection ;  Surgeon: Reid Jackson MD;  Location: [facility] MAIN OR;  Service: Pain Management;  Laterality: Right;    THORACIC EPIDURAL N/A 2024    Procedure: thoracic epidural steroid injection at approximately T7/8 14429;  Surgeon: Reid Jackson MD;  Location: [facility] OR;  Service: Pain Management;  Laterality: N/A;       Family History   Problem Relation Age of Onset    Colon cancer Neg Hx     Colon polyps Neg Hx        Social History     Socioeconomic History    Marital status:    Tobacco Use    Smoking status: Former     Current packs/day: 0.00     Average packs/day: 1 pack/day for 36.0 years (36.0 ttl pk-yrs)     Types: Cigarettes     Start date: 1951     Quit date: 1987     Years since quittin.1     Passive exposure: Past    Smokeless tobacco: Never   Vaping Use    Vaping status: Never Used   Substance and Sexual Activity    Alcohol use: No    Drug use: No    Sexual activity: Not Currently     Partners: Female         OBJECTIVE  VITAL SIGNS:   Vitals:    25 1530 25 1545 25 1600 25 1630   BP:       Pulse: 63 64 59 60   Resp:       Temp:       TempSrc:       SpO2: 97% 97% 97% 96%   Weight:       Height:            Constitutional: Well developed and resting comfortable. No acute distress.  Nasal cannula in place in the nose  HENT:  Atraumatic. Normocephalic. Bilateral external ears normal. Nose normal.    Eyes:  Conjunctiva normal.  No periorbital edema.  Neck:  ROM normal, supple.    Cardiovascular:   RRR  without murmurs, rubs, or gallops. Extremities appear warm and well perfused.  Thorax & Lungs:  Respiratory effort normal.  Lungs CTAB   Abdomen:  Nondistended.  Nontender.  Musculoskeletal: Swelling and deformity of the right ankle.  Patient unable to flex and extend the ankle.  He does have a 1+ palpable DP and PT pulse.  Patient reports numbness in all toes, he has intact capillary refill.  No tenderness to palpation of the proximal tibia or fibula.  No tenderness to palpation around the knee.  He had negative pain with logroll. On the right.    Skin:  Warm and dry.    Neurologic:  Awake and alert .  Psychiatric:  Affect normal. Thought process is linear and goal directed       EKG  ECG 12 Lead      Date/Time: 2/19/2025 6:43 PM    Performed by: Shakeel Aparicio MD  Authorized by: Junaid Green MD  Interpreted by ED physician  Comparison: compared with previous ECG from 5/1/2023  Comparison to previous ECG: unchanged  Rhythm: sinus rhythm  Rate: normal  QRS axis: normal  Clinical impression: normal ECG  Comments: No evidence of arrhythmia or ischemia           PROCEDURES/ULTRASOUND      ED COURSE AND MEDICAL DECISION MAKING  Pertinent Labs & Imaging studies reviewed. (See chart for details).    Jose Reynoso is a 84 y.o. male presenting to the ER with right ankle pain after a fall.  On arrival patient was noted to be afebrile and hemodynamically stable satting 96% on his home 2 L.  Examination as above concerning for likely injury to the ankle with obvious deformity, swelling and numbness but patient was vascularly intact.  No other traumatic findings noted on secondary exam, no proximal leg, knee or hip pain.  No abdominal or chest wall tenderness.  He did not hit his head, at this time I do not believe cross-sectional imaging is indicated, he is not on any blood thinners.    X-ray of the ankle was obtained which did show a trimalleolar fracture.  Dr. Ying with orthopedics was contacted, patient  will need surgery, plan for possible surgery tonight.  Patient was made NPO.  Due to need for surgery, preoperative EKG along with labs were obtained.  Labs overall reassuring however potassium was hemolyzed, repeat ordered.  EKG obtained which showed normal sinus rhythm, normal intervals, no evidence of acute ischemia, computer read with minimal ST elevation in the anterior leads not significantly appreciated by me..             Medications Administered  Medications   oxyCODONE (ROXICODONE) immediate release tablet 5 mg (5 mg Oral Given 2/19/25 1538)   acetaminophen (TYLENOL) tablet 1,000 mg (1,000 mg Oral Given 2/19/25 1538)   HYDROmorphone (DILAUDID) injection 0.5 mg (0.5 mg Intravenous Given 2/19/25 7737)       Final diagnoses:   Closed trimalleolar fracture of right ankle, initial encounter     ED Disposition       ED Disposition   Decision to Admit    Condition   --    Comment   Level of Care: Med/Surg [1]   Diagnosis: Trimalleolar fracture [649927]   Admitting Physician: IRVING GREY [[id number]]   Certification: I Certify That Inpatient Hospital Services Are Medically Necessary For Greater Than 2 Midnights               No follow-up provider specified.     Medication List      No changes were made to your prescriptions during this visit.            In cases where narcotics are prescribed, [facility] report was obtained, reviewed, and made part of record. After examining available information, and risks of prescribing or dispensing controlled substances was explained to the patient (including non-treatment or other treatment), it is considered medically appropriate to administer  narcotics as prescribed.    Part of this note may be an electronic transcription/translation of spoken language to printed text using the Dragon Dictation System.     MD Markus Balbuena Calyn Michele, MD  02/19/25 0033

## 2025-02-19 NOTE — CONSULTS
Orthopedic Consult      Patient: Jose Reynoso    Date of Admission: 2/19/2025  3:02 PM    YOB: 1940    Medical Record Number: [id number]    Attending Physician: Junaid Green MD  Consulting Physician:       Chief Complaints: Trimalleolar fracture [S82.853A]  Closed trimalleolar fracture of right ankle, initial encounter [S82.851A]      History of Present Illness: 84 y.o. male admitted to [facility] to services of Junaid Green MD with Trimalleolar fracture [S82.853A]  Closed trimalleolar fracture of right ankle, initial encounter [S82.851A].    Patient is a pleasant 84-year-old male who was walking today outside and slipped on the ice twisting his right ankle.  He immediate pain decreased range of motion and inability to bear weight.  Patient was brought to the hospital and diagnosed with a trimalleolar ankle fracture subluxation.  I was consulted for further evaluation and management of this fracture.  The patient denies pain elsewhere or any other trauma.  He denies any numbness or tingling in his lower extremity or foot or toes or any bleeding at the time of injury.  Allergies   Allergen Reactions    Penicillins Anaphylaxis    Adhesive Tape Itching    Morphine Nausea And Vomiting       Medications:   Home Medications:  Medications Prior to Admission   Medication Sig Dispense Refill Last Dose/Taking    albuterol (ACCUNEB) 1.25 MG/3ML nebulizer solution Take 3 mL by nebulization Every 4 (Four) Hours As Needed for Wheezing.   2/19/2025    alfuzosin (UROXATRAL) 10 MG 24 hr tablet Take 1 tablet by mouth Daily.   2/18/2025    Calcium-Phosphorus-Vitamin D (CITRACAL +D3 PO) Take  by mouth.   2/19/2025    finasteride (PROSCAR) 5 MG tablet Take 1 tablet by mouth Daily.   2/19/2025    guaiFENesin (MUCINEX) 600 MG 12 hr tablet Take 1 tablet by mouth 2 (Two) Times a Day.   2/19/2025    lidocaine (LIDODERM) 5 % Place 1 patch on the skin as directed by provider Daily. Remove & Discard patch within  12 hours or as directed by MD   Past Week    lidocaine-prilocaine (EMLA) 2.5-2.5 % cream Apply  topically to the appropriate area as directed As Needed for Mild Pain. 1 each 3 2/18/2025    Magnesium 300 MG capsule Take 250 mg by mouth 2 (Two) Times a Day.   2/19/2025    omeprazole (priLOSEC) 40 MG capsule 2 (Two) Times a Day.   2/19/2025    oxyCODONE-acetaminophen (PERCOCET)  MG per tablet Take 1 tablet by mouth Every 4 (Four) Hours As Needed for Moderate Pain. Fill date 2/13/2025 180 tablet 0 Past Week    polyethylene glycol (MIRALAX) 17 GM/SCOOP powder Take 17 g by mouth Daily. 238 g 0 2/19/2025    tiotropium bromide-olodaterol (STIOLTO RESPIMAT) 2.5-2.5 MCG/ACT aerosol solution inhaler Inhale 2 puffs Daily.   2/19/2025    acyclovir (ZOVIRAX) 800 MG tablet Take 1 tablet by mouth 3 (Three) Times a Day As Needed (For Shingles). 3 times a day for 7 days       ALBUTEROL IN Inhale.       cetirizine (zyrTEC) 10 MG tablet Take 1 tablet by mouth Daily.       clotrimazole-betamethasone (LOTRISONE) 1-0.05 % cream Apply 1 application topically to the appropriate area as directed 2 (Two) Times a Day. 45 g 0     fluticasone (FLONASE) 50 MCG/ACT nasal spray 2 sprays into the nostril(s) as directed by provider Daily. 15 mL 11 More than a month    mupirocin (BACTROBAN) 2 % ointment        naloxone (NARCAN) 4 MG/0.1ML nasal spray 1 spray into the nostril(s) as directed by provider As Needed (in the event of respiratory depression, use as soon as possible then call 911). 2 each 1        Current Medications:  Scheduled Meds:  Continuous Infusions:No current facility-administered medications for this encounter.    PRN Meds:.       Past Medical History:   Diagnosis Date    Cancer     Cervical disc disorder     Chronic pain disorder     Neck, back, chest, legs    Colon polyp     COPD (chronic obstructive pulmonary disease)     Extremity pain     Fractures 1983    Back    GERD (gastroesophageal reflux disease)     Headache      Injury of back     Joint pain Hip    Low back pain     Lumbosacral disc disease     Neck pain     Peripheral neuropathy     Shingles         Past Surgical History:   Procedure Laterality Date    APPENDECTOMY      CERVICAL FUSION      CHOLECYSTECTOMY      COLONOSCOPY N/A 2018    Procedure: COLONOSCOPY with polypectomy;  Surgeon: Ja Lopez MD;  Location:  [facility] OR;  Service: Gastroenterology    COLONOSCOPY W/ POLYPECTOMY N/A 2023    Procedure: COLONOSCOPY WITH POLYPECTOMY;  Surgeon: Ja Lopez MD;  Location:  [facility] OR;  Service: Gastroenterology;  Laterality: N/A;  Diverticulosis; Sigmoid polyp x 4; Rectal polyp x 2    ENDOSCOPY N/A 2019    Procedure: ESOPHAGOGASTRODUODENOSCOPY w/ biopsies;  Surgeon: Ja Lopez MD;  Location: [facility] OR;  Service: Gastroenterology    EPIDURAL BLOCK      LAMINECTOMY      NECK SURGERY      SPINAL FUSION      SPINE SURGERY      STEROID INJECTION HIP Right 2024    Procedure: right hip injection ;  Surgeon: Reid Jackson MD;  Location: [facility];  Service: Pain Management;  Laterality: Right;    STEROID INJECTION HIP Right 2025    Procedure: right hip injection ;  Surgeon: Reid Jackson MD;  Location: [facility];  Service: Pain Management;  Laterality: Right;    THORACIC EPIDURAL N/A 2024    Procedure: thoracic epidural steroid injection at approximately T7/8 02073;  Surgeon: Reid Jackson MD;  Location: [facility];  Service: Pain Management;  Laterality: N/A;        Social History     Occupational History    Not on file   Tobacco Use    Smoking status: Former     Current packs/day: 0.00     Average packs/day: 1 pack/day for 36.0 years (36.0 ttl pk-yrs)     Types: Cigarettes     Start date: 1951     Quit date: 1987     Years since quittin.1     Passive exposure: Past    Smokeless tobacco: Never   Vaping Use    Vaping status: Never Used   Substance and Sexual  Activity    Alcohol use: No    Drug use: No    Sexual activity: Not Currently     Partners: Female      Social History     Social History Narrative    Not on file        Family History   Problem Relation Age of Onset    Colon cancer Neg Hx     Colon polyps Neg Hx          Review of Systems:   HEENT: Patient denies any headaches, vision changes, change in hearing, or tinnitus, Patient denies any rhinorrhea, epistaxis, sinus pain, mouth or dental problems, sore throat or hoarseness, or dysphagia  Pulmonary: Patient denies any cough, congestion, SOA, or wheezing  Cardiovascular: Patient denies any chest pain, dyspnea, palpitations, weakness, intolerance of exercise, varicosities, swelling of extremities, known murmur  Gastrointestinal:  Patient denies nausea, vomiting, diarrhea, constipation, loss of appetite, change in appetite, dysphagia, gas, heartburn, melena, change in bowel habits, use of laxatives or other drugs to alter the function of the gastrointestinal tract.  Genital/Urinary: Patient denies dysuria, change in color of urine, change in frequency of urination, pain with urgency, incontinence, retention, or nocturia.  Musculoskeletal: Patient denies increased warmth; redness; or swelling of joints; limitation of function; deformity; crepitation: notes pain in right ankle as documented above in HPI.  Denies pain in low back or neck.    Neurological: Patient denies dizziness, tremor, ataxia, difficulty in speaking, change in speech, paresthesia, loss of sensation, seizures, syncope, changes in memory.  Endocrine system: Patient denies tremors, palpitations, intolerance of heat or cold, polyuria, polydipsia, polyphagia, diaphoresis, exophthalmos, or goiter.  Psychological: Patient denies thoughts/plans of harming self or other; depression, insomnia, night terrors, alfa, memory loss, disorientation.  Skin: Patient denies any bruising, rashes, discoloration, pruritus, wounds, ulcers, decubiti, changes in the hair  or nails  Hematopoietic: Patient denies history of spontaneous or excessive bleeding, epistaxis, hematuria, melena, fatigue, enlarged or tender lymph nodes, pallor, history of anemia.    Physical Exam: 84 y.o. male  Vitals:    02/19/25 1530 02/19/25 1545 02/19/25 1600 02/19/25 1630   BP:       Pulse: 63 64 59 60   Resp:       Temp:       TempSrc:       SpO2: 97% 97% 97% 96%   Weight:       Height:         General: No acute distress  Pulmonary: Unlabored breathing  Cardiovascular: regular rate and rhythm  RLE  Sensation normal  2+ dorsalis pedis and posterior tibial pulse  Able to flex and extend toes  Moderate swelling  No signs of DVT or compartment syndrome      Diagnostic Tests:  Admission on 02/19/2025   Component Date Value Ref Range Status    QT Interval 02/19/2025 423  ms Preliminary    QTC Interval 02/19/2025 420  ms Preliminary    Glucose 02/19/2025 104 (H)  65 - 99 mg/dL Final    BUN 02/19/2025 18  8 - 23 mg/dL Final    Creatinine 02/19/2025 0.86  0.76 - 1.27 mg/dL Final    Sodium 02/19/2025 140  136 - 145 mmol/L Final    Potassium 02/19/2025 4.4  3.5 - 5.2 mmol/L Final    Slight hemolysis detected by analyzer. Result may be falsely elevated.    Chloride 02/19/2025 104  98 - 107 mmol/L Final    CO2 02/19/2025 25.6  22.0 - 29.0 mmol/L Final    Calcium 02/19/2025 8.9  8.6 - 10.5 mg/dL Final    Total Protein 02/19/2025 7.2  6.0 - 8.5 g/dL Final    Albumin 02/19/2025 4.4  3.5 - 5.2 g/dL Final    ALT (SGPT) 02/19/2025 14  1 - 41 U/L Final    AST (SGOT) 02/19/2025 23  1 - 40 U/L Final    Alkaline Phosphatase 02/19/2025 62  39 - 117 U/L Final    Total Bilirubin 02/19/2025 0.7  0.0 - 1.2 mg/dL Final    Globulin 02/19/2025 2.8  gm/dL Final    A/G Ratio 02/19/2025 1.6  g/dL Final    BUN/Creatinine Ratio 02/19/2025 20.9  7.0 - 25.0 Final    Anion Gap 02/19/2025 10.4  5.0 - 15.0 mmol/L Final    eGFR 02/19/2025 86.5  >60.0 mL/min/1.73 Final    Protime 02/19/2025 12.2  12.1 - 15.0 Seconds Final    INR 02/19/2025 0.87  (L)  0.90 - 1.10 Final    WBC 02/19/2025 7.28  3.40 - 10.80 10*3/mm3 Final    RBC 02/19/2025 4.47  4.14 - 5.80 10*6/mm3 Final    Hemoglobin 02/19/2025 13.8  13.0 - 17.7 g/dL Final    Hematocrit 02/19/2025 42.8  37.5 - 51.0 % Final    MCV 02/19/2025 95.7  79.0 - 97.0 fL Final    MCH 02/19/2025 30.9  26.6 - 33.0 pg Final    MCHC 02/19/2025 32.2  31.5 - 35.7 g/dL Final    RDW 02/19/2025 11.6 (L)  12.3 - 15.4 % Final    RDW-SD 02/19/2025 41.7  37.0 - 54.0 fl Final    MPV 02/19/2025 9.7  6.0 - 12.0 fL Final    Platelets 02/19/2025 199  140 - 450 10*3/mm3 Final    Neutrophil % 02/19/2025 72.7  42.7 - 76.0 % Final    Lymphocyte % 02/19/2025 14.6 (L)  19.6 - 45.3 % Final    Monocyte % 02/19/2025 10.0  5.0 - 12.0 % Final    Eosinophil % 02/19/2025 1.9  0.3 - 6.2 % Final    Basophil % 02/19/2025 0.3  0.0 - 1.5 % Final    Immature Grans % 02/19/2025 0.5  0.0 - 0.5 % Final    Neutrophils, Absolute 02/19/2025 5.29  1.70 - 7.00 10*3/mm3 Final    Lymphocytes, Absolute 02/19/2025 1.06  0.70 - 3.10 10*3/mm3 Final    Monocytes, Absolute 02/19/2025 0.73  0.10 - 0.90 10*3/mm3 Final    Eosinophils, Absolute 02/19/2025 0.14  0.00 - 0.40 10*3/mm3 Final    Basophils, Absolute 02/19/2025 0.02  0.00 - 0.20 10*3/mm3 Final    Immature Grans, Absolute 02/19/2025 0.04  0.00 - 0.05 10*3/mm3 Final    Potassium 02/19/2025 4.0  3.5 - 5.2 mmol/L Final     XR Ankle 3+ View Right    Result Date: 2/19/2025  Impression: Impression: 1.Trimalleolar fracture subluxation. Electronically Signed: Davonte Arechiga MD  2/19/2025 4:25 PM EST  Workstation ID: [id number]     Assessment:    Closed trimalleolar fracture of right ankle    Trimalleolar fracture           Plan:    He has sustained a right displaced ankle fracture.  I discussed nonsurgical alternatives to operative fixation with the patient including cast placement and serial radiographic follow-up.  I discussed with the patient that based on the fracture morphology this represents an unstable ankle injury.   I discussed that with operative fixation this would likely allow for early mobility and recovery as well as increased long-term ankle stability.  The patient is interested in proceeding with open reduction and internal fixation possible syndesmotic fixation and all associated procedures.    The goals, indications, risks, and complications of the procedure were discussed with the patient. Specifically, I discussed the expectations for postoperative recovery including 4 to 6 weeks of immobilization and non weight bearing as well as the potential need for physical therapy. I also discussed the risks of the procedure, including stiffness, fracture, implant loosening, implant failure, nonunion or malunion, venous thromboembolism, infection, nerve palsy, vascular injury, foot numbness, ankle arthritis and possible need for more procedures and the risks of anesthesia. I also explained that he would meet with Anesthesiology preoperatively to discuss anesthetic risk.  All questions were answered.     Plan for right ankle open reduction and all associated procedures.    Implants: Medline Ankle Plating Sytem, tight rope available  Antibiotics: Cefazolin  Admission Type: Inpatient        Ernie Ying MD      Dictated utilizing Dragon dictation

## 2025-02-20 LAB
QT INTERVAL: 423 MS
QTC INTERVAL: 420 MS

## 2025-02-20 PROCEDURE — 63710000001 LIDOCAINE 4 % PATCH: Performed by: HOSPITALIST

## 2025-02-20 PROCEDURE — 99232 SBSQ HOSP IP/OBS MODERATE 35: CPT | Performed by: HOSPITALIST

## 2025-02-20 PROCEDURE — A9270 NON-COVERED ITEM OR SERVICE: HCPCS | Performed by: HOSPITALIST

## 2025-02-20 PROCEDURE — 25010000002 HYDROMORPHONE 1 MG/ML SOLUTION: Performed by: HOSPITALIST

## 2025-02-20 PROCEDURE — 63710000001 REVEFENACIN 175 MCG/3ML SOLUTION: Performed by: HOSPITALIST

## 2025-02-20 PROCEDURE — G0378 HOSPITAL OBSERVATION PER HR: HCPCS

## 2025-02-20 PROCEDURE — 63710000001 PANTOPRAZOLE 40 MG TABLET DELAYED-RELEASE: Performed by: HOSPITALIST

## 2025-02-20 PROCEDURE — 94799 UNLISTED PULMONARY SVC/PX: CPT

## 2025-02-20 PROCEDURE — 63710000001 FINASTERIDE 5 MG TABLET: Performed by: HOSPITALIST

## 2025-02-20 PROCEDURE — 25010000002 CEFAZOLIN PER 500 MG: Performed by: INTERNAL MEDICINE

## 2025-02-20 PROCEDURE — 63710000001 ONDANSETRON ODT 4 MG TABLET DISPERSIBLE: Performed by: HOSPITALIST

## 2025-02-20 PROCEDURE — A9270 NON-COVERED ITEM OR SERVICE: HCPCS | Performed by: INTERNAL MEDICINE

## 2025-02-20 PROCEDURE — 63710000001 OXYCODONE-ACETAMINOPHEN 10-325 MG TABLET: Performed by: HOSPITALIST

## 2025-02-20 PROCEDURE — 63710000001 TAMSULOSIN 0.4 MG CAPSULE: Performed by: HOSPITALIST

## 2025-02-20 PROCEDURE — 63710000001 ASPIRIN 81 MG TABLET DELAYED-RELEASE: Performed by: INTERNAL MEDICINE

## 2025-02-20 PROCEDURE — 63710000001 HYDROCODONE-ACETAMINOPHEN 5-325 MG TABLET: Performed by: INTERNAL MEDICINE

## 2025-02-20 PROCEDURE — 94761 N-INVAS EAR/PLS OXIMETRY MLT: CPT

## 2025-02-20 PROCEDURE — 63710000001 GUAIFENESIN 600 MG TABLET SUSTAINED-RELEASE 12 HOUR: Performed by: HOSPITALIST

## 2025-02-20 PROCEDURE — 97161 PT EVAL LOW COMPLEX 20 MIN: CPT

## 2025-02-20 PROCEDURE — 25010000002 HYDROMORPHONE PER 4 MG: Performed by: HOSPITALIST

## 2025-02-20 RX ORDER — OXYCODONE AND ACETAMINOPHEN 10; 325 MG/1; MG/1
1 TABLET ORAL EVERY 4 HOURS PRN
Status: DISCONTINUED | OUTPATIENT
Start: 2025-02-20 | End: 2025-02-21 | Stop reason: HOSPADM

## 2025-02-20 RX ORDER — HYDROMORPHONE HYDROCHLORIDE 1 MG/ML
0.5 INJECTION, SOLUTION INTRAMUSCULAR; INTRAVENOUS; SUBCUTANEOUS
Status: DISCONTINUED | OUTPATIENT
Start: 2025-02-20 | End: 2025-02-21

## 2025-02-20 RX ORDER — ONDANSETRON 2 MG/ML
4 INJECTION INTRAMUSCULAR; INTRAVENOUS EVERY 4 HOURS PRN
Status: DISCONTINUED | OUTPATIENT
Start: 2025-02-20 | End: 2025-02-21 | Stop reason: HOSPADM

## 2025-02-20 RX ORDER — ONDANSETRON 4 MG/1
4 TABLET, ORALLY DISINTEGRATING ORAL EVERY 4 HOURS PRN
Status: DISCONTINUED | OUTPATIENT
Start: 2025-02-20 | End: 2025-02-21 | Stop reason: HOSPADM

## 2025-02-20 RX ORDER — HYDROMORPHONE HYDROCHLORIDE 1 MG/ML
0.5 INJECTION, SOLUTION INTRAMUSCULAR; INTRAVENOUS; SUBCUTANEOUS
Status: DISCONTINUED | OUTPATIENT
Start: 2025-02-20 | End: 2025-02-20

## 2025-02-20 RX ADMIN — ASPIRIN 81 MG: 81 TABLET, COATED ORAL at 08:51

## 2025-02-20 RX ADMIN — TAMSULOSIN HYDROCHLORIDE 0.4 MG: 0.4 CAPSULE ORAL at 20:20

## 2025-02-20 RX ADMIN — REVEFENACIN 175 MCG: 175 SOLUTION RESPIRATORY (INHALATION) at 10:51

## 2025-02-20 RX ADMIN — PANTOPRAZOLE SODIUM 40 MG: 40 TABLET, DELAYED RELEASE ORAL at 06:31

## 2025-02-20 RX ADMIN — FINASTERIDE 5 MG: 5 TABLET, FILM COATED ORAL at 08:51

## 2025-02-20 RX ADMIN — HYDROMORPHONE HYDROCHLORIDE 0.5 MG: 1 INJECTION, SOLUTION INTRAMUSCULAR; INTRAVENOUS; SUBCUTANEOUS at 05:41

## 2025-02-20 RX ADMIN — ARFORMOTEROL TARTRATE 15 MCG: 15 SOLUTION RESPIRATORY (INHALATION) at 19:53

## 2025-02-20 RX ADMIN — HYDROMORPHONE HYDROCHLORIDE 0.5 MG: 1 INJECTION, SOLUTION INTRAMUSCULAR; INTRAVENOUS; SUBCUTANEOUS at 13:25

## 2025-02-20 RX ADMIN — ARFORMOTEROL TARTRATE 15 MCG: 15 SOLUTION RESPIRATORY (INHALATION) at 10:42

## 2025-02-20 RX ADMIN — HYDROCODONE BITARTRATE AND ACETAMINOPHEN 1 TABLET: 5; 325 TABLET ORAL at 04:58

## 2025-02-20 RX ADMIN — SODIUM CHLORIDE 2000 MG: 9 INJECTION, SOLUTION INTRAVENOUS at 04:58

## 2025-02-20 RX ADMIN — SODIUM CHLORIDE 2000 MG: 9 INJECTION, SOLUTION INTRAVENOUS at 13:18

## 2025-02-20 RX ADMIN — HYDROMORPHONE HYDROCHLORIDE 0.5 MG: 1 INJECTION, SOLUTION INTRAMUSCULAR; INTRAVENOUS; SUBCUTANEOUS at 10:47

## 2025-02-20 RX ADMIN — HYDROMORPHONE HYDROCHLORIDE 0.5 MG: 1 INJECTION, SOLUTION INTRAMUSCULAR; INTRAVENOUS; SUBCUTANEOUS at 19:19

## 2025-02-20 RX ADMIN — GUAIFENESIN 600 MG: 600 TABLET ORAL at 20:21

## 2025-02-20 RX ADMIN — GUAIFENESIN 600 MG: 600 TABLET ORAL at 08:51

## 2025-02-20 RX ADMIN — ASPIRIN 81 MG: 81 TABLET, COATED ORAL at 20:21

## 2025-02-20 RX ADMIN — ONDANSETRON 4 MG: 4 TABLET, ORALLY DISINTEGRATING ORAL at 11:43

## 2025-02-20 RX ADMIN — OXYCODONE AND ACETAMINOPHEN 1 TABLET: 10; 325 TABLET ORAL at 20:24

## 2025-02-20 RX ADMIN — LIDOCAINE 1 PATCH: 4 PATCH TOPICAL at 20:20

## 2025-02-20 NOTE — PROGRESS NOTES
Patient: Jose Reynoso  Procedure(s):  ANKLE OPEN REDUCTION INTERNAL FIXATION  Anesthesia type: regional, MAC    Patient location: [facility] Surgical Floor  Last vitals:   Vitals:    02/20/25 0338   BP: 93/56   Pulse: 62   Resp: 20   Temp: 97.1 °F (36.2 °C)   SpO2: 96%     Level of consciousness: awake, alert, and oriented    Post-anesthesia pain: adequate analgesia  Airway patency: patent  Respiratory: unassisted  Cardiovascular: stable and blood pressure at baseline  Hydration: euvolemic    Anesthetic complications: no

## 2025-02-20 NOTE — ANESTHESIA POSTPROCEDURE EVALUATION
Patient: Jose Reynoso    Procedure Summary       Date: 02/19/25 Room / Location:  [facility]    Anesthesia Start: 2131 Anesthesia Stop: 2302    Procedure: ANKLE OPEN REDUCTION INTERNAL FIXATION (Right: Ankle) Diagnosis:       Closed trimalleolar fracture of right ankle, initial encounter      (Closed trimalleolar fracture of right ankle, initial encounter [S82.851A])    Surgeons: Ernie Ying MD Provider: Christine Becker MD    Anesthesia Type: regional, MAC ASA Status: 3            Anesthesia Type: regional, MAC    Vitals  Vitals Value Taken Time   /76 02/19/25 2330   Temp 97.5 °F (36.4 °C) 02/19/25 2315   Pulse 62 02/19/25 2333   Resp 15 02/19/25 2330   SpO2 98 % 02/19/25 2333   Vitals shown include unfiled device data.        Post Anesthesia Care and Evaluation    Patient location during evaluation: PACU  Patient participation: complete - patient participated  Level of consciousness: awake and alert  Pain score: 0  Pain management: satisfactory to patient    Airway patency: patent  Anesthetic complications: No anesthetic complications  PONV Status: none  Cardiovascular status: acceptable  Respiratory status: acceptable  Hydration status: acceptable

## 2025-02-20 NOTE — PLAN OF CARE
Goal Outcome Evaluation:  Plan of Care Reviewed With: patient        Progress: improving  Outcome Evaluation: Returned from surgery. Alert and oriented. No complaints of pain or discomfort. Tolerating 2L of oxygen, uses same amount at home. Rested well during the night. VSS. Voiding without difficulty.

## 2025-02-20 NOTE — PLAN OF CARE
"Goal Outcome Evaluation:  Plan of Care Reviewed With: patient           Outcome Evaluation: Physical therapy evaluation complete. Patient NWB on right LE.  patient performs supine to/from sit with min assist, requiring assistance due to residual numbness from surgical block.  Patient performs sit to stand with CGA and gait with rolling walker x25 feet, CGA.  Patient able to maintain NWB status with mobility activity, no loss of balance noted with direction changes.  Patient able to avoid obstacles with mobility.  Patient declines need for DME, states he has a rolling walker and elevated toilets at home.  Discussed wheelchair rental, pt declines at this time.  Discussed stair training due to having 2 curb steps to enter his home.  Patient educated on technique with walker, pt states \"It is fine, I am just going to crawl up them.\"  Discussed safety concerns regarding crawling up the steps compared to using a walker for curb steps.  Will follow up with patient x1 additional visit if he remains in facility overnight, anticipate patient to return home with family support/assistance as needed.    Anticipated Discharge Disposition (PT): home with assist                        "

## 2025-02-20 NOTE — PLAN OF CARE
Goal Outcome Evaluation:           Progress: improving  Outcome Evaluation: complaints of pain, md aware, dilaudid as needed. concerns about going home, case mngr aware. working with therapy. plans for discharge when able

## 2025-02-20 NOTE — PROGRESS NOTES
"Hospitalist Team      Patient Care Team:  Veronica Jerome APRN as PCP - General (Family Medicine)  Jessica, Ja Noriega MD as Consulting Physician (Gastroenterology)      Chief Complaint: Follow-up right trimalleolar fracture    Subjective    No acute events overnight.  Mr. Reynoso reports his leg is still fairly numb.  He has been not up yet with physical therapy.  He denies chest pain and dyspnea.  He is tolerating p.o.    Objective    Vital Signs  Temp:  [96.8 °F (36 °C)-98 °F (36.7 °C)] 97.1 °F (36.2 °C)  Heart Rate:  [55-70] 62  Resp:  [12-20] 20  BP: ()/(56-86) 93/56  Oxygen Therapy  SpO2: 96 %  Pulse Oximetry Type: Continuous  Device (Oxygen Therapy): nasal cannula  Flow (L/min) (Oxygen Therapy): 2  ETCO2 (mmHg): 43 mmHg}    Flowsheet Rows      Flowsheet Row First Filed Value   Admission Height 177.8 cm (70\") Documented at 02/19/2025 1509   Admission Weight 99.8 kg (220 lb) Documented at 02/19/2025 1509          Physical Exam:    General: Appears younger than stated age in no acute distress.  Lungs: Breath sounds are clear throughout all fields.  Excursion is normal.  CV: Regular rate and rhythm.  I appreciate no murmurs.  Radial pulses are 2+ and symmetric.  Cap refill is less than 3 seconds at the right toes.  Abdomen: Obese, soft, and nontender.  Bowel sounds are active.  MSK: No clubbing or cyanosis of the fingers or toes.  Neuro: Cranial nerves II through XII are grossly intact.  Psych: Pleasant affect.  [place] x 3.    Results Review:     I reviewed the patient's new clinical results.    Lab Results (last 24 hours)       Procedure Component Value Units Date/Time    Potassium [[id number]]  (Normal) Collected: 02/19/25 1749    Specimen: Blood Updated: 02/19/25 1805     Potassium 4.0 mmol/L     Comprehensive Metabolic Panel [[id number]]  (Abnormal) Collected: 02/19/25 1657    Specimen: Blood Updated: 02/19/25 1722     Glucose 104 mg/dL      BUN 18 mg/dL      Creatinine 0.86 mg/dL      " Sodium 140 mmol/L      Potassium 4.4 mmol/L      Comment: Slight hemolysis detected by analyzer. Result may be falsely elevated.        Chloride 104 mmol/L      CO2 25.6 mmol/L      Calcium 8.9 mg/dL      Total Protein 7.2 g/dL      Albumin 4.4 g/dL      ALT (SGPT) 14 U/L      AST (SGOT) 23 U/L      Alkaline Phosphatase 62 U/L      Total Bilirubin 0.7 mg/dL      Globulin 2.8 gm/dL      A/G Ratio 1.6 g/dL      BUN/Creatinine Ratio 20.9     Anion Gap 10.4 mmol/L      eGFR 86.5 mL/min/1.73     Narrative:      GFR Categories in Chronic Kidney Disease (CKD)      GFR Category          GFR (mL/min/1.73)    Interpretation  G1                     90 or greater         Normal or high (1)  G2                      60-89                Mild decrease (1)  G3a                   45-59                Mild to moderate decrease  G3b                   30-44                Moderate to severe decrease  G4                    15-29                Severe decrease  G5                    14 or less           Kidney failure          (1)In the absence of evidence of kidney disease, neither GFR category G1 or G2 fulfill the criteria for CKD.    eGFR calculation 2021 CKD-EPI creatinine equation, which does not include race as a factor    Protime-INR [[id number]]  (Abnormal) Collected: 02/19/25 1657    Specimen: Blood Updated: 02/19/25 1722     Protime 12.2 Seconds      INR 0.87    Narrative:      Therapeutic Ranges for INR: 2.0-3.0 (PT 20-30)                              2.5-3.5 (PT 25-34)    CBC & Differential [[id number]]  (Abnormal) Collected: 02/19/25 1657    Specimen: Blood Updated: 02/19/25 1705    Narrative:      The following orders were created for panel order CBC & Differential.  Procedure                               Abnormality         Status                     ---------                               -----------         ------                     CBC Auto Differential[[id number]]        Abnormal            Final result                  Please view results for these tests on the individual orders.    CBC Auto Differential [[id number]]  (Abnormal) Collected: 02/19/25 1657    Specimen: Blood Updated: 02/19/25 1705     WBC 7.28 10*3/mm3      RBC 4.47 10*6/mm3      Hemoglobin 13.8 g/dL      Hematocrit 42.8 %      MCV 95.7 fL      MCH 30.9 pg      MCHC 32.2 g/dL      RDW 11.6 %      RDW-SD 41.7 fl      MPV 9.7 fL      Platelets 199 10*3/mm3      Neutrophil % 72.7 %      Lymphocyte % 14.6 %      Monocyte % 10.0 %      Eosinophil % 1.9 %      Basophil % 0.3 %      Immature Grans % 0.5 %      Neutrophils, Absolute 5.29 10*3/mm3      Lymphocytes, Absolute 1.06 10*3/mm3      Monocytes, Absolute 0.73 10*3/mm3      Eosinophils, Absolute 0.14 10*3/mm3      Basophils, Absolute 0.02 10*3/mm3      Immature Grans, Absolute 0.04 10*3/mm3             Imaging Results (Last 24 Hours)       Procedure Component Value Units Date/Time    XR Ankle 3+ View Right [[id number]] Collected: 02/19/25 2248     Updated: 02/19/25 2253    Narrative:      XR ANKLE 3+ VW RIGHT    Date of Exam: 2/19/2025 10:43 PM EST    Indication: post op 3 view ankle    Comparison: 2/19/2025    Findings:  6 C-arm views of the right ankle. Fluoroscopy time is 51 seconds. Ref air kerma is 0.56 mGy.     Images show fixation of the distal fibular fracture with a plate and multiple screws. Medial malleolus fracture has been fixed with 2 screws. There is anatomic alignment of the primary fracture fragments, and the ankle mortise has been restored. There is   a radiolucent syndesmotic anchor.      Impression:      Satisfactory ORIF of the ankle.        Electronically Signed: Chaka Ware MD    2/19/2025 10:50 PM EST    Workstation ID: [id number]    FL C Arm During Surgery [[id number]] Resulted: 02/19/25 2246     Updated: 02/19/25 2247    US Sonosite Portable [[id number]] Resulted: 02/19/25 1937     Updated: 02/19/25 1937    Narrative:      This procedure was auto-finalized with no dictation required.    XR  Ankle 3+ View Right [[id number]] Collected: 02/19/25 1623     Updated: 02/19/25 1628    Narrative:      XR ANKLE 3+ VW RIGHT    Date of Exam: 2/19/2025 4:20 PM EST    Indication: ankle injury    Comparison: None available.    Findings:  There is an oblique fracture involving the distal fibula extending nearly to the ankle joint with lateral angulation of the distal fracture fragment and up to one shaft width lateral displacement distally. There is a transverse fracture involving the   base of the medial malleolus with 1 cm lateral displacement of the distal fracture fragment. There appears to be a posterior malleolar fracture as well. There is lateral posterior subluxation of the talar dome in relation to the tibial plafond. There is   associated soft tissue swelling/deformity. There is mild dorsal midfoot degenerative arthrosis.      Impression:      Impression:  1.Trimalleolar fracture subluxation.      Electronically Signed: Davonte Arechiga MD    2/19/2025 4:25 PM EST    Workstation ID: [id number]            Medication Review:   I have reviewed the patient's current medication list    Current Facility-Administered Medications:     albuterol (PROVENTIL) nebulizer solution 0.083% 2.5 mg/3mL, 1.25 mg, Nebulization, Q4H PRN, Junaid Green MD    arformoterol (BROVANA) nebulizer solution 15 mcg, 15 mcg, Nebulization, BID - RT **AND** revefenacin (YUPELRI) nebulizer solution 175 mcg, 175 mcg, Nebulization, Daily - RT, Junaid Green MD    aspirin EC tablet 81 mg, 81 mg, Oral, Q12H, Ernie Ying MD    ceFAZolin 2000 mg IVPB in 100 mL NS (VTB), 2,000 mg, Intravenous, Q8H, Ernie Ying MD, 2,000 mg at 02/20/25 0458    finasteride (PROSCAR) tablet 5 mg, 5 mg, Oral, Daily, Junaid Green MD    fluticasone (FLONASE) 50 MCG/ACT nasal spray 2 spray, 2 spray, Nasal, Daily, Junaid Green MD    guaiFENesin (MUCINEX) 12 hr tablet 600 mg, 600 mg, Oral, BID, Junaid Green MD    HYDROcodone-acetaminophen (NORCO)   MG per tablet 1 tablet, 1 tablet, Oral, Q4H PRN, Ernie Ying MD    HYDROcodone-acetaminophen (NORCO) 5-325 MG per tablet 1 tablet, 1 tablet, Oral, Q4H PRN, Ernie Ying MD, 1 tablet at 02/20/25 0458    HYDROmorphone (DILAUDID) injection 0.5 mg, 0.5 mg, Intravenous, Q2H PRN, Junaid Green MD, 0.5 mg at 02/20/25 0541    Lidocaine 4 % 1 patch, 1 patch, Transdermal, Q24H, Junaid Green MD    lidocaine-prilocaine (EMLA) 2.5-2.5 % cream, , Topical, PRN, Junaid Green MD    morphine injection 2 mg, 2 mg, Intravenous, Q4H PRN **AND** naloxone (NARCAN) injection 0.4 mg, 0.4 mg, Intravenous, Q5 Min PRN, Ernie Ying MD    pantoprazole (PROTONIX) EC tablet 40 mg, 40 mg, Oral, Q AM, Junaid Green MD, 40 mg at 02/20/25 0631    tamsulosin (FLOMAX) 24 hr capsule 0.4 mg, 0.4 mg, Oral, Nightly, Junaid Green MD      Assessment & Plan     Trimalleolar fracture of the right lower extremity: Postop day #1.  Ortho following.  Continue DVT prophylaxis with aspirin.  Staff report he is complaining of pain, but when I have talked to him, he reports that his leg is still numb.  No change to current pain regimen.  COPD: Nothing acute.  Continue nebulized maintenance therapy.  Add OPEP.  GERD: Nothing acute.  Continue PPI therapy.  BPH: No acute issues.  Continue home regimen.    Plan for disposition: Await PT evaluation    Junaid Green MD  02/20/25  08:36 EST

## 2025-02-20 NOTE — CASE MANAGEMENT/SOCIAL WORK
Discharge Planning Assessment   [facility]     Patient Name: Jose Reynoso  MRN: [id number]  Today's Date: 2/20/2025    Admit Date: 2/19/2025    Plan: Home with wife   Discharge Needs Assessment       Row Name 02/20/25 1311       Living Environment    People in Home spouse    Name(s) of People in Home anjali Kilgore    Current Living Arrangements home    Duration at Residence 47/48 Y    Potentially Unsafe Housing Conditions none    In the past 12 months has the electric, gas, oil, or water company threatened to shut off services in your home? No    Primary Care Provided by self    Provides Primary Care For spouse  pt states he has been helping his wife at home due to her being on chemo    Caregiving Concerns pt voiced concerns regards getting up his two steps at home    Family Caregiver if Needed child(candido), adult;spouse    Family Caregiver Names Magui, wife, daughter Kita and her  and son See    Quality of Family Relationships helpful;stressful;supportive    Able to Return to Prior Arrangements yes    Living Arrangement Comments Patient states he lives with his wife in a single story house with a basement and two steps to gain entry without handrail       Resource/Environmental Concerns    Resource/Environmental Concerns home accessibility    Home Accessibility Concerns stairs to enter home    Transportation Concerns none       Transportation Needs    In the past 12 months, has lack of transportation kept you from medical appointments or from getting medications? no    In the past 12 months, has lack of transportation kept you from meetings, work, or from getting things needed for daily living? No       Food Insecurity    Within the past 12 months, you worried that your food would run out before you got the money to buy more. Never true    Within the past 12 months, the food you bought just didn't last and you didn't have money to get more. Never true       Transition Planning    Patient/Family  "Anticipates Transition to home with family    Patient/Family Anticipated Services at Transition none    Transportation Anticipated family or friend will provide  pt states daughter or son will be able to provide ride home at discharge.       Discharge Needs Assessment    Readmission Within the Last 30 Days no previous admission in last 30 days    Current Outpatient/Agency/Support Group --  none    Equipment Currently Used at Home oxygen;nebulizer;grab bar;shower chair;commode;cpap  has bench in shower, higher commode, home oxygen 2L cont and CPAP ([employer])    Concerns to be Addressed adjustment to diagnosis/illness;discharge planning    Concerns Comments pt voiced concerns getting up his two steps at home    Anticipated Changes Related to Illness inability to care for self    Equipment Needed After Discharge --  unsure at this time, per PT recommendations, pt and wife states they know someone who has a BSC and will borrow it if needed and daughter states she is going to order a knee scooter    Outpatient/Agency/Support Group Needs --  none    Discharge Facility/Level of Care Needs --  none    Patient's Choice of Community Agency(s) none    Current Discharge Risk other (see comments)  steps to get into  home, mobility limitations due to ankle fx    Discharge Coordination/Progress Patient states he plans on returning home at discharge with his wife and adult children to help as needed.                   Discharge Plan       Row Name 02/20/25 3976       Plan    Plan Home with wife    Patient/Family in Agreement with Plan yes    Plan Comments Into room and introduced self and role of CM. Discussed discharge disposition with patient, wife Magui and daughter Kita with permission. Patient is currently resting in bed and voiced complaints of pain which he states \"the pills do nothing for me and I had to get some IV meds\" and CM updated primary RN Di of such. Patient confirms the info on his face sheet is correct and " "he see's YAMILE Duran as PCP. He states he uses [facility] in [place] and normally has no problem picking up or paying for his meds. He states he does have a living will and is unsure if it is on file here or note and CM informed him that I did not see it and encouraged him to bring in a copy at his earliest convenience and he verbalized understanding. Patient states he lives with his wife Magui in a single story house with a basement and two steps for the garage with no handrail to gain entry. He states he normally has no issues entering the home or maneuvering inside and is worried how he will manage the two steps to get into the house. CM did discuss with him that PT will instruct him on proper mechanics on doing this safely and he states \"well they were in here earlier and tried to get me to hop but I got sick to my stomach and had to stop and get back into bed\". Patient states he is independent with his ADLs' and is currently helping his wife at home while she is getting chemo, states he drives and either his daughter or son See who is coming into town will be able to provide ride home at discharge. He states he has an taller commode, bench in shower, waling stick, grab bar in bathroom, oxygen and CPAP ([employer]) and is unsure what equipment he may need at discharge. Patient's daughter states she is ordering him a knee scooter for use in the home and patient wanted to know if insurance would cover the cost for this equipment and CM let him know they would not and daughter verbalized understanding. CM explained to patient that he would be NWB and HH would not be indicated at discharge and could possibly ordered after follow up with MD if indicated. Patient states he plans on returning home at discharge with his wife and adult children to help as needed. He and his family had no other questions at this time. CM updated primary nurse Di and Dr. Green of patient's concerns regarding his pain. CM " will follow.                  Continued Care and Services - Admitted Since 2/19/2025    No active coordination exists for this encounter.          Demographic Summary    No documentation.                  Functional Status    No documentation.                  Psychosocial    No documentation.                  Abuse/Neglect    No documentation.                  Legal    No documentation.                  Substance Abuse    No documentation.                  Patient Forms    No documentation.                     Radha Cedeno RN

## 2025-02-20 NOTE — ANESTHESIA PREPROCEDURE EVALUATION
Anesthesia Evaluation     Patient summary reviewed and Nursing notes reviewed   NPO Solid Status: > 8 hours  NPO Liquid Status: > 8 hours           Airway   Mallampati: II  TM distance: >3 FB  Neck ROM: full  No difficulty expected  Dental    (+) implants    Comment: Implants full set lower    Pulmonary    (+) lung cancer (remission-chemo and xrt), COPD moderate,home oxygen (2lpm NC)  (-) not a smoker    ROS comment: Uses nebulizer 3-4 times per day.  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    Use of blood products discussed with patient  Consented to blood products.      CODE STATUS:

## 2025-02-20 NOTE — THERAPY EVALUATION
Patient Name: Jose Reynoso  : 1940    MRN: [id number]                              Today's Date: 2025       Admit Date: 2025    Visit Dx:     ICD-10-CM ICD-9-CM   1. Closed trimalleolar fracture of right ankle, initial encounter  S82.851A 824.6     Patient Active Problem List   Diagnosis    Epigastric pain    Gastroesophageal reflux disease with esophagitis    COPD with acute exacerbation    Bronchitis    Pericardial effusion    Lung cancer    Chronic respiratory failure with hypoxia    Pericarditis    Esophageal dysphagia    Personal history of colonic polyps    Adjustment reaction with anxiety and depression    Irritable bowel syndrome with diarrhea    Right hip pain    Post herpetic neuralgia    Closed trimalleolar fracture of right ankle    Trimalleolar fracture     Past Medical History:   Diagnosis Date    Cancer     left lung dx     Cervical disc disorder     Chronic pain disorder     Neck, back, chest, legs    Colon polyp     COPD (chronic obstructive pulmonary disease)     Extremity pain     Fractures 1983    Back    GERD (gastroesophageal reflux disease)     Headache     Injury of back     Joint pain Hip    Low back pain     Lumbosacral disc disease     Neck pain     Peripheral neuropathy     Shingles      Past Surgical History:   Procedure Laterality Date    APPENDECTOMY      CERVICAL FUSION      CHOLECYSTECTOMY      COLONOSCOPY N/A 2018    Procedure: COLONOSCOPY with polypectomy;  Surgeon: Ja Lopez MD;  Location: [facility] OR;  Service: Gastroenterology    COLONOSCOPY W/ POLYPECTOMY N/A 2023    Procedure: COLONOSCOPY WITH POLYPECTOMY;  Surgeon: Ja Lopez MD;  Location: [facility] OR;  Service: Gastroenterology;  Laterality: N/A;  Diverticulosis; Sigmoid polyp x 4; Rectal polyp x 2    ENDOSCOPY N/A 2019    Procedure: ESOPHAGOGASTRODUODENOSCOPY w/ biopsies;  Surgeon: Ja Lopez MD;  Location: [facility] OR;  Service:  Gastroenterology    EPIDURAL BLOCK      LAMINECTOMY      NECK SURGERY      SPINAL FUSION      SPINE SURGERY      STEROID INJECTION HIP Right 09/19/2024    Procedure: right hip injection 20610;  Surgeon: Reid Jackson MD;  Location: [facility];  Service: Pain Management;  Laterality: Right;    STEROID INJECTION HIP Right 01/28/2025    Procedure: right hip injection 20610;  Surgeon: Reid Jackson MD;  Location: [facility];  Service: Pain Management;  Laterality: Right;    THORACIC EPIDURAL N/A 11/12/2024    Procedure: thoracic epidural steroid injection at approximately T7/8 65273;  Surgeon: Reid Jackson MD;  Location: [facility];  Service: Pain Management;  Laterality: N/A;      General Information       Row Name 02/20/25 1021          Physical Therapy Time and Intention    Document Type evaluation  -     Mode of Treatment physical therapy  -       Row Name 02/20/25 1021          General Information    Patient Profile Reviewed yes  pt fell, suffered right ankle fracture, s/p repair.  NWB on right LE  -JW     Prior Level of Function independent:;all household mobility;community mobility  -     Existing Precautions/Restrictions fall;non-weight bearing  NWB right LE  -JW     Barriers to Rehab none identified  -       Row Name 02/20/25 1021          Living Environment    People in Home spouse  -       Row Name 02/20/25 1021          Home Main Entrance    Number of Stairs, Main Entrance two  -JW     Stair Railings, Main Entrance none  -       Row Name 02/20/25 1021          Stairs Within Home, Primary    Stairs, Within Home, Primary single story house  -     Number of Stairs, Within Home, Primary none  -       Row Name 02/20/25 1021          Cognition    Orientation Status (Cognition) oriented x 3  -               User Key  (r) = Recorded By, (t) = Taken By, (c) = Cosigned By      Initials Name Provider Type    Bethany Pedraza, PAXTON Physical Therapist                   Mobility        [facility] Name 02/20/25 1021          Bed Mobility    Bed Mobility supine-sit;sit-supine  -     Supine-Sit Mc[place]ough (Bed Mobility) minimum assist (75% patient effort)  -     Sit-Supine Mc[place]ough (Bed Mobility) minimum assist (75% patient effort)  -     Assistive Device (Bed Mobility) bed rails;head of bed elevated  -     Comment, (Bed Mobility) pt requires assistance for right LE into/out of bed due to numbness from block  -[facility] Name 02/20/25 1021          Transfers    Comment, (Transfers) cues for hand placement  -[facility] Name 02/20/25 1021          Sit-Stand Transfer    Sit-Stand Mc[place]ough (Transfers) contact guard;verbal cues  -     Assistive Device (Sit-Stand Transfers) walker, front-wheeled  -     Comment, (Sit-Stand Transfer) cues for hand placement  -[facility] Name 02/20/25 1021          Gait/Stairs (Locomotion)    Mc[place]ough Level (Gait) contact guard;verbal cues  -     Assistive Device (Gait) walker, front-wheeled  -     Distance in Feet (Gait) 25  -     Deviations/Abnormal Patterns (Gait) rigoberto decreased  -     Bilateral Gait Deviations forward flexed posture  -     Comment, (Gait/Stairs) pt able to maintain NWB during mobility activity, reports fatigue with activity  -[facility] Name 02/20/25 1021          Mobility    Extremity Weight-bearing Status right lower extremity  -     Right Lower Extremity (Weight-bearing Status) non weight-bearing (NWB)  -               User Key  (r) = Recorded By, (t) = Taken By, (c) = Cosigned By      Initials Name Provider Type     Bethany Live, PT Physical Therapist                   Obj/Interventions       [facility] Name 02/20/25 1021          Range of Motion Comprehensive    Comment, General Range of Motion left LE WFL, right LE not formally tested  -[facility] Name 02/20/25 1021          Strength Comprehensive (MMT)    Comment, General Manual Muscle Testing (MMT) Assessment left LE WFL, right LE not formally  tested  -       Row Name 02/20/25 1021          Balance    Comment, Balance SBA for static standing balance, CGA for dynamic standing balance  -       Row Name 02/20/25 1021          Sensory Assessment (Somatosensory)    Sensory Assessment (Somatosensory) other (see comments)  pt reports residual numbness in right LE from surgical block  -               User Key  (r) = Recorded By, (t) = Taken By, (c) = Cosigned By      Initials Name Provider Type     Bethany Live, PT Physical Therapist                   Goals/Plan       [facility] Name 02/20/25 1021          Bed Mobility Goal 1 (PT)    Activity/Assistive Device (Bed Mobility Goal 1, PT) bed mobility activities, all  -JW     Casey Level/Cues Needed (Bed Mobility Goal 1, PT) supervision required  -JW     Time Frame (Bed Mobility Goal 1, PT) 1 day  -JW     Progress/Outcomes (Bed Mobility Goal 1, PT) new goal  -[facility] Name 02/20/25 1021          Transfer Goal 1 (PT)    Activity/Assistive Device (Transfer Goal 1, PT) transfers, all  -JW     Casey Level/Cues Needed (Transfer Goal 1, PT) supervision required  -JW     Time Frame (Transfer Goal 1, PT) 1 day  -JW     Progress/Outcome (Transfer Goal 1, PT) new goal  -[facility] Name 02/20/25 1021          Gait Training Goal 1 (PT)    Activity/Assistive Device (Gait Training Goal 1, PT) gait (walking locomotion);assistive device use  -JW     Casey Level (Gait Training Goal 1, PT) supervision required  -JW     Distance (Gait Training Goal 1, PT) 30  -JW     Time Frame (Gait Training Goal 1, PT) 1 day  -JW     Progress/Outcome (Gait Training Goal 1, PT) new goal  -[facility] Name 02/20/25 1021          Therapy Assessment/Plan (PT)    Planned Therapy Interventions (PT) balance training;bed mobility training;gait training;patient/family education;strengthening;transfer training  -               User Key  (r) = Recorded By, (t) = Taken By, (c) = Cosigned By      Initials Name Provider Type      "Bethany Live, PT Physical Therapist                   Clinical Impression       Row Name 02/20/25 1021          Pain    Pain Location ankle  -     Pain Side/Orientation right  -     Pre/Posttreatment Pain Comment pt reports increased pain with activity, does not rate  -       Row Name 02/20/25 1021          Plan of Care Review    Plan of Care Reviewed With patient  -     Outcome Evaluation Physical therapy evaluation complete. Patient NWB on right LE.  patient performs supine to/from sit with min assist, requiring assistance due to residual numbness from surgical block.  Patient performs sit to stand with CGA and gait with rolling walker x25 feet, CGA.  Patient able to maintain NWB status with mobility activity, no loss of balance noted with direction changes.  Patient able to avoid obstacles with mobility.  Patient declines need for DME, states he has a rolling walker and elevated toilets at home.  Discussed wheelchair rental, pt declines at this time.  Will follow up with patient x1 additional visit if he remains in facility overnight, anticipate patient to return home with family support/assistance as needed.  -       Row Name 02/20/25 1021          Therapy Assessment/Plan (PT)    Patient/Family Therapy Goals Statement (PT) \"go home\"  -     Rehab Potential (PT) good  -     Criteria for Skilled Interventions Met (PT) yes;meets criteria  -     Therapy Frequency (PT) other (see comments)  1 visit  -     Predicted Duration of Therapy Intervention (PT) 1 day  -       Row Name 02/20/25 1021          Positioning and Restraints    Pre-Treatment Position in bed  -JW     Post Treatment Position bed  -JW     In Bed notified nsg;supine;call light within reach;encouraged to call for assist;with other staff  -               User Key  (r) = Recorded By, (t) = Taken By, (c) = Cosigned By      Initials Name Provider Type    JW Bethany Live, PT Physical Therapist                   Outcome Measures       " Row Name 02/20/25 1140 02/20/25 0851       How much help from another person do you currently need...    Turning from your back to your side while in flat bed without using bedrails? 4  -JW 4  -HL    Moving from lying on back to sitting on the side of a flat bed without bedrails? 3  -JW 4  -HL    Moving to and from a bed to a chair (including a wheelchair)? 3  -JW 2  -HL    Standing up from a chair using your arms (e.g., wheelchair, bedside chair)? 3  -JW 3  -HL    Climbing 3-5 steps with a railing? 3  -JW 2  -HL    To walk in hospital room? 3  -JW 2  -HL    AM-PAC 6 Clicks Score (PT) 19  -JW 17  -HL    Highest Level of Mobility Goal 6 --> Walk 10 steps or more  - 5 --> Static standing  -HL      Row Name 02/20/25 0000          How much help from another person do you currently need...    Turning from your back to your side while in flat bed without using bedrails? 4  -AB     Moving from lying on back to sitting on the side of a flat bed without bedrails? 3  -AB     Moving to and from a bed to a chair (including a wheelchair)? 2  -AB     Standing up from a chair using your arms (e.g., wheelchair, bedside chair)? 2  -AB     Climbing 3-5 steps with a railing? 2  -AB     To walk in hospital room? 2  -AB     AM-PAC 6 Clicks Score (PT) 15  -AB     Highest Level of Mobility Goal 4 --> Transfer to chair/commode  -AB       Row Name 02/20/25 1140          Functional Assessment    Outcome Measure Options AM-PAC 6 Clicks Basic Mobility (PT)  -               User Key  (r) = Recorded By, (t) = Taken By, (c) = Cosigned By      Initials Name Provider Type    AB Padmini Eric, RN Registered Nurse    Bethany Pedraza, PAXTON Physical Therapist    Di Khanna, RN Registered Nurse                                 Physical Therapy Education       Title: PT OT SLP Therapies (Done)       Topic: Physical Therapy (Done)       Point: Mobility training (Done)       Learning Progress Summary            Patient Acceptance, E,TB, VU by LENORE  at 2/20/2025 1141                      Point: Precautions (Done)       Learning Progress Summary            Patient Acceptance, E,TB, VU by  at 2/20/2025 1141                                      User Key       Initials Effective Dates Name Provider Type Discipline     06/16/21 -  Bethany Live, PT Physical Therapist PT                  PT Recommendation and Plan  Planned Therapy Interventions (PT): balance training, bed mobility training, gait training, patient/family education, strengthening, transfer training  Outcome Evaluation: Physical therapy evaluation complete. Patient NWB on right LE.  patient performs supine to/from sit with min assist, requiring assistance due to residual numbness from surgical block.  Patient performs sit to stand with CGA and gait with rolling walker x25 feet, CGA.  Patient able to maintain NWB status with mobility activity, no loss of balance noted with direction changes.  Patient able to avoid obstacles with mobility.  Patient declines need for DME, states he has a rolling walker and elevated toilets at home.  Discussed wheelchair rental, pt declines at this time.  Will follow up with patient x1 additional visit if he remains in facility overnight, anticipate patient to return home with family support/assistance as needed.     Time Calculation:   PT Evaluation Complexity  History, PT Evaluation Complexity: 1-2 personal factors and/or comorbidities  Examination of Body Systems (PT Eval Complexity): 1-2 elements  Clinical Presentation (PT Evaluation Complexity): stable  Clinical Decision Making (PT Evaluation Complexity): low complexity  Overall Complexity (PT Evaluation Complexity): low complexity     PT Charges       Row Name 02/20/25 1142             Time Calculation    Start Time 1021  -      Stop Time 1045  -      Time Calculation (min) 24 min  -      PT Received On 02/20/25  -      PT - Next Appointment 02/21/25  -                User Key  (r) = Recorded By, (t) =  Taken By, (c) = Cosigned By      Initials Name Provider Type    Bethany Pedraza, PT Physical Therapist                  Therapy Charges for Today       Code Description Service Date Service Provider Modifiers Qty    69065415301 HC PT EVAL LOW COMPLEXITY 2 2/20/2025 Bethany Live, PT GP 1            PT G-Codes  Outcome Measure Options: AM-PAC 6 Clicks Basic Mobility (PT)  AM-PAC 6 Clicks Score (PT): 19  PT Discharge Summary  Anticipated Discharge Disposition (PT): home with assist    Bethany Live, PT  2/20/2025

## 2025-02-20 NOTE — ANESTHESIA PROCEDURE NOTES
Peripheral Block    Pre-sedation assessment completed: 2/19/2025 9:08 PM    Patient reassessed immediately prior to procedure    Patient location during procedure: pre-op  Start time: 2/19/2025 9:11 PM  Stop time: 2/19/2025 9:16 PM  Reason for block: at surgeon's request and post-op pain management  Performed by  Anesthesiologist: Christine Becker MD  Preanesthetic Checklist  Completed: patient identified, IV checked, site marked, risks and benefits discussed, surgical consent, monitors and equipment checked, pre-op evaluation and timeout performed  Prep:  Pt Position: supine  Sterile barriers:cap, gloves, mask and washed/disinfected hands  Prep: ChloraPrep  Patient monitoring: blood pressure monitoring, continuous pulse oximetry and EKG  Procedure    Sedation: yes  Performed under: local infiltration  Guidance:ultrasound guided and nerve stimulator    ULTRASOUND INTERPRETATION.  Using ultrasound guidance a 21 G gauge needle was placed in close proximity to the femoral nerve, at which point, under ultrasound guidance anesthetic was injected in the area of the nerve and spread of the anesthesia was seen on ultrasound in close proximity thereto.  There were no abnormalities seen on ultrasound; a digital image was taken; and the patient tolerated the procedure with no complications. Images:still images obtained, printed/placed on chart  Loss of twitch: 0.5 mA  Laterality:right  Block Type:femoral  Injection Technique:single-shot  Needle Type:echogenic  Needle Gauge:21 G  Resistance on Injection: none    Medications Used: bupivacaine PF (MARCAINE) injection 0.5% - Injection   5 mL - 2/19/2025 9:16:00 PM  dexmedetomidine HCl (PRECEDEX) injection - Perineural   20 mcg - 2/19/2025 9:16:00 PM      Post Assessment  Injection Assessment: negative aspiration for heme, no paresthesia on injection and incremental injection  Patient Tolerance:comfortable throughout block  Complications:no  Performed by: Christine Becker MD

## 2025-02-20 NOTE — PROGRESS NOTES
[facility]     Progress Note    Patient Name: Jose Reynoso  : 1940  MRN: [id number]  Primary Care Physician:  Veronica Jerome APRN  Date of admission: 2025    Subjective   Subjective     Chief Complaint: Status post ORIF right trimalleolar fracture dislocation    History of Present Illness  Patient Reports patient is postop day 1 for the above-stated procedure.  He is afebrile hemodynamically stable but is complaining of nausea this morning.  Pain is controlled with the medication but again is causing nausea.  He has not had physical therapy as of yet.  Still states does not have any feeling in his foot but he has good capillary refill.  He is in a posterior splint and the ankle is immobilized    Review of Systems    Objective   Objective     Vitals:   Temp:  [96.8 °F (36 °C)-98 °F (36.7 °C)] 97.1 °F (36.2 °C)  Heart Rate:  [55-70] 68  Resp:  [12-20] 20  BP: ()/(56-86) 93/56  Flow (L/min) (Oxygen Therapy):  [2] 2    Physical Exam     Result Review    Result Review:  I have personally reviewed the results from the time of this admission to 2025 11:45 EST and agree with these findings:  []  Laboratory list / accordion  []  Microbiology  []  Radiology  []  EKG/Telemetry   []  Cardiology/Vascular   []  Pathology  []  Old records  []  Other:  Most notable findings include:       Assessment & Plan   Assessment / Plan     Brief Patient Summary:  Jose Reynoso is a 84 y.o. male who status post ORIF of a right trimalleolar fracture dislocation.  He is stable today complaining of nausea but otherwise is doing well.  Awaiting physical therapy evaluation.    Active Hospital Problems:  Active Hospital Problems    Diagnosis     **Closed trimalleolar fracture of right ankle     Trimalleolar fracture      Plan:       VTE Prophylaxis:  No VTE prophylaxis order currently exists.        CODE STATUS:    Code Status (Patient has no pulse and is not breathing): CPR (Attempt to  Resuscitate)  Medical Interventions (Patient has pulse or is breathing): Full Support    Disposition:  I expect patient to be discharged .  Patient is nonweightbearing is concerned about discharge home today as he does not believe he can get into his house as he has 2 steps to manage.  Awaiting physical therapy evaluation and decision on timing of discharge.  Answered all questions    Tomás Remy MD

## 2025-02-20 NOTE — ANESTHESIA PROCEDURE NOTES
Peripheral Block    Pre-sedation assessment completed: 2/19/2025 9:17 PM    Patient reassessed immediately prior to procedure    Patient location during procedure: pre-op  Start time: 2/19/2025 9:18 PM  Stop time: 2/19/2025 9:22 PM  Reason for block: at surgeon's request and post-op pain management  Performed by  Anesthesiologist: Christine Becker MD  Preanesthetic Checklist  Completed: patient identified, IV checked, site marked, risks and benefits discussed, surgical consent, monitors and equipment checked, pre-op evaluation and timeout performed  Prep:  Pt Position: left lateral decubitus  Sterile barriers:cap, gloves, mask and washed/disinfected hands  Prep: ChloraPrep  Patient monitoring: blood pressure monitoring, continuous pulse oximetry and EKG  Procedure    Sedation: yes  Performed under: local infiltration  Guidance:nerve stimulator    Laterality:right  Block Type:sciatic  Injection Technique:single-shot  Needle Type:echogenic  Needle Gauge:21 G      Medications Used: bupivacaine PF (MARCAINE) injection 0.5% - Injection   17 mL - 2/19/2025 9:22:00 PM  dexmedetomidine HCl (PRECEDEX) injection - Perineural   20 mcg - 2/19/2025 9:22:00 PM      Post Assessment  Injection Assessment: negative aspiration for heme, no paresthesia on injection and incremental injection  Patient Tolerance:comfortable throughout block  Complications:no  Performed by: Christine Becker MD

## 2025-02-21 ENCOUNTER — READMISSION MANAGEMENT (OUTPATIENT)
Dept: CALL CENTER | Facility: HOSPITAL | Age: 85
End: 2025-02-21
Payer: MEDICARE

## 2025-02-21 VITALS
BODY MASS INDEX: 31.5 KG/M2 | TEMPERATURE: 97.4 F | WEIGHT: 220 LBS | SYSTOLIC BLOOD PRESSURE: 126 MMHG | RESPIRATION RATE: 22 BRPM | OXYGEN SATURATION: 94 % | HEART RATE: 60 BPM | HEIGHT: 70 IN | DIASTOLIC BLOOD PRESSURE: 68 MMHG

## 2025-02-21 LAB
ANION GAP SERPL CALCULATED.3IONS-SCNC: 7 MMOL/L (ref 5–15)
BASOPHILS # BLD AUTO: 0.02 10*3/MM3 (ref 0–0.2)
BASOPHILS NFR BLD AUTO: 0.2 % (ref 0–1.5)
BUN SERPL-MCNC: 15 MG/DL (ref 8–23)
BUN/CREAT SERPL: 20.8 (ref 7–25)
CALCIUM SPEC-SCNC: 8.5 MG/DL (ref 8.6–10.5)
CHLORIDE SERPL-SCNC: 102 MMOL/L (ref 98–107)
CO2 SERPL-SCNC: 26 MMOL/L (ref 22–29)
CREAT SERPL-MCNC: 0.72 MG/DL (ref 0.76–1.27)
DEPRECATED RDW RBC AUTO: 40.5 FL (ref 37–54)
EGFRCR SERPLBLD CKD-EPI 2021: 91.2 ML/MIN/1.73
EOSINOPHIL # BLD AUTO: 0.17 10*3/MM3 (ref 0–0.4)
EOSINOPHIL NFR BLD AUTO: 2 % (ref 0.3–6.2)
ERYTHROCYTE [DISTWIDTH] IN BLOOD BY AUTOMATED COUNT: 11.7 % (ref 12.3–15.4)
GLUCOSE SERPL-MCNC: 147 MG/DL (ref 65–99)
HCT VFR BLD AUTO: 34.3 % (ref 37.5–51)
HGB BLD-MCNC: 11.3 G/DL (ref 13–17.7)
IMM GRANULOCYTES # BLD AUTO: 0.04 10*3/MM3 (ref 0–0.05)
IMM GRANULOCYTES NFR BLD AUTO: 0.5 % (ref 0–0.5)
LYMPHOCYTES # BLD AUTO: 0.94 10*3/MM3 (ref 0.7–3.1)
LYMPHOCYTES NFR BLD AUTO: 10.9 % (ref 19.6–45.3)
MCH RBC QN AUTO: 31.3 PG (ref 26.6–33)
MCHC RBC AUTO-ENTMCNC: 32.9 G/DL (ref 31.5–35.7)
MCV RBC AUTO: 95 FL (ref 79–97)
MONOCYTES # BLD AUTO: 1.53 10*3/MM3 (ref 0.1–0.9)
MONOCYTES NFR BLD AUTO: 17.8 % (ref 5–12)
NEUTROPHILS NFR BLD AUTO: 5.91 10*3/MM3 (ref 1.7–7)
NEUTROPHILS NFR BLD AUTO: 68.6 % (ref 42.7–76)
NRBC BLD AUTO-RTO: 0 /100 WBC (ref 0–0.2)
PLATELET # BLD AUTO: 158 10*3/MM3 (ref 140–450)
PMV BLD AUTO: 10.4 FL (ref 6–12)
POTASSIUM SERPL-SCNC: 4 MMOL/L (ref 3.5–5.2)
RBC # BLD AUTO: 3.61 10*6/MM3 (ref 4.14–5.8)
SODIUM SERPL-SCNC: 135 MMOL/L (ref 136–145)
WBC NRBC COR # BLD AUTO: 8.61 10*3/MM3 (ref 3.4–10.8)

## 2025-02-21 PROCEDURE — 94664 DEMO&/EVAL PT USE INHALER: CPT

## 2025-02-21 PROCEDURE — A9270 NON-COVERED ITEM OR SERVICE: HCPCS | Performed by: HOSPITALIST

## 2025-02-21 PROCEDURE — 99238 HOSP IP/OBS DSCHRG MGMT 30/<: CPT | Performed by: HOSPITALIST

## 2025-02-21 PROCEDURE — 63710000001 PANTOPRAZOLE 40 MG TABLET DELAYED-RELEASE: Performed by: HOSPITALIST

## 2025-02-21 PROCEDURE — 63710000001 GUAIFENESIN 600 MG TABLET SUSTAINED-RELEASE 12 HOUR: Performed by: HOSPITALIST

## 2025-02-21 PROCEDURE — G0378 HOSPITAL OBSERVATION PER HR: HCPCS

## 2025-02-21 PROCEDURE — A9270 NON-COVERED ITEM OR SERVICE: HCPCS | Performed by: INTERNAL MEDICINE

## 2025-02-21 PROCEDURE — 94799 UNLISTED PULMONARY SVC/PX: CPT

## 2025-02-21 PROCEDURE — 80048 BASIC METABOLIC PNL TOTAL CA: CPT | Performed by: HOSPITALIST

## 2025-02-21 PROCEDURE — 63710000001 REVEFENACIN 175 MCG/3ML SOLUTION: Performed by: HOSPITALIST

## 2025-02-21 PROCEDURE — 63710000001 ASPIRIN 81 MG TABLET DELAYED-RELEASE: Performed by: INTERNAL MEDICINE

## 2025-02-21 PROCEDURE — 63710000001 FINASTERIDE 5 MG TABLET: Performed by: HOSPITALIST

## 2025-02-21 PROCEDURE — 63710000001 OXYCODONE-ACETAMINOPHEN 10-325 MG TABLET: Performed by: HOSPITALIST

## 2025-02-21 PROCEDURE — 94761 N-INVAS EAR/PLS OXIMETRY MLT: CPT

## 2025-02-21 PROCEDURE — 85025 COMPLETE CBC W/AUTO DIFF WBC: CPT | Performed by: HOSPITALIST

## 2025-02-21 RX ORDER — ASPIRIN 81 MG/1
81 TABLET ORAL EVERY 12 HOURS SCHEDULED
Qty: 28 TABLET | Refills: 0 | Status: SHIPPED | OUTPATIENT
Start: 2025-02-21 | End: 2025-03-07

## 2025-02-21 RX ADMIN — FINASTERIDE 5 MG: 5 TABLET, FILM COATED ORAL at 08:49

## 2025-02-21 RX ADMIN — REVEFENACIN 175 MCG: 175 SOLUTION RESPIRATORY (INHALATION) at 07:28

## 2025-02-21 RX ADMIN — PANTOPRAZOLE SODIUM 40 MG: 40 TABLET, DELAYED RELEASE ORAL at 06:06

## 2025-02-21 RX ADMIN — GUAIFENESIN 600 MG: 600 TABLET ORAL at 08:49

## 2025-02-21 RX ADMIN — OXYCODONE AND ACETAMINOPHEN 1 TABLET: 10; 325 TABLET ORAL at 06:06

## 2025-02-21 RX ADMIN — OXYCODONE AND ACETAMINOPHEN 1 TABLET: 10; 325 TABLET ORAL at 00:10

## 2025-02-21 RX ADMIN — ASPIRIN 81 MG: 81 TABLET, COATED ORAL at 08:49

## 2025-02-21 RX ADMIN — OXYCODONE AND ACETAMINOPHEN 1 TABLET: 10; 325 TABLET ORAL at 09:49

## 2025-02-21 RX ADMIN — ARFORMOTEROL TARTRATE 15 MCG: 15 SOLUTION RESPIRATORY (INHALATION) at 07:29

## 2025-02-21 NOTE — CASE MANAGEMENT/SOCIAL WORK
Continued Stay Note  ALEIDA Joy     Patient Name: Jose Reynoso  MRN: [id number]  Today's Date: 2/21/2025    Admit Date: 2/19/2025    Plan: Home with wife and Chely METCALF   Discharge Plan       Row Name 02/21/25 1425       Plan    Plan Home with wife and [facility]    Patient/Family in Agreement with Plan yes    Plan Comments Patient noted for discharge. CM followed up with patient and his sons in room and discussed discharge concerns. Son See is concerned how patient will get to his bedroom and  into his bed. CM offered hospital and patient states he will not need this. Son would like information on  rental of hospital bed if they should need it once they get home and CM provided See with [employer] [facility] information and also spoke with [facility] liaison and notified him of potential for rental of hospital bed and all verbalized understanding. CM provided son with in home personal care agency list in case they would need extra help in the home and son verbalized understanding. CM spoke with Douglas with for [facility]  and he states they can follow up in the home on Sunday for home safety eval and son is agreeable to this service. Patient plans on returning home at discharge with family to help as needed, [facility] and family to follow up with [facility] if needed for hospital bed rental. Patient and family had no other questions. CM will follow.                   Discharge Codes    No documentation.                 Expected Discharge Date and Time       Expected Discharge Date Expected Discharge Time    Feb 21, 2025               Radha Cedeno RN

## 2025-02-21 NOTE — DISCHARGE PLACEMENT REQUEST
"Jose Reynoso (84 y.o. Male)       Date of Birth   1940    Social Security Number       Address   [address] [address]    Home Phone   [phone]    MRN   [id number]       Sikhism   Patient Refused    Marital Status                               Admission Date   2/19/25    Admission Type   Emergency    Admitting Provider   Junaid Green MD    Attending Provider   Junaid Green MD    Department, Room/Bed   [facility] MED SURG, 1404/1       Discharge Date       Discharge Disposition   Home-Health Care [facility]    Discharge Destination                                 Attending Provider: Junaid Green MD    Allergies: Penicillins, Adhesive Tape, Morphine    Isolation: None   Infection: None   Code Status: CPR    Ht: 177.8 cm (70\")   Wt: 99.8 kg (220 lb)    Admission Cmt: None   Principal Problem: Closed trimalleolar fracture of right ankle [S82.851A]                   Active Insurance as of 2/19/2025       Primary Coverage       Payor Plan Insurance Group Employer/Plan Group    MEDICARE RAILROAD MEDICARE        Payor Plan Address Payor Plan Phone Number Payor Plan Fax Number Effective Dates    [address] [phone]  12/1/2008 - None Entered    [facility] [id number]         Subscriber Name Subscriber Birth Date Member ID       JOSE REYNOSO 1940 [id number]               Secondary Coverage       Payor Plan Insurance Group Employer/Plan Group    [facility] SUPP [id number]       Payor Plan Address Payor Plan Phone Number Payor Plan Fax Number Effective Dates    [address]   12/1/2016 - None Entered    [facility] [address]         Subscriber Name Subscriber Birth Date Member ID       JOSE REYNOSO 1940 [id number]                     Emergency Contacts        (Rel.) Home Phone Work Phone Mobile Phone    EdgardoMagui (Spouse) [phone] -- --    EdgardoMick (Brother) [phone] -- --                "

## 2025-02-21 NOTE — CASE MANAGEMENT/SOCIAL WORK
Case Management Discharge Note      Final Note: NH home/[facility]         Selected Continued Care - Discharged on 2/21/2025 Admission date: 2/19/2025 - Discharge disposition: Home-Health Care Svc      Destination    No services have been selected for the patient.                Durable Medical Equipment       Service Provider Services Address Phone Fax Patient Preferred    [facility] [address] [place] [address] [phone] --              Dialysis/Infusion    No services have been selected for the patient.                Home Medical Care       Service Provider Services Address Phone Fax Patient Preferred    [facility] [address] [address] [phone] --              Therapy    No services have been selected for the patient.                Community Resources    No services have been selected for the patient.                Community & DME    No services have been selected for the patient.                         Final Discharge Disposition Code: 06 - home with home health care

## 2025-02-21 NOTE — DISCHARGE SUMMARY
"Jose Reynoso  1940  [id number]    Hospitalists Discharge Summary    Date of Admission: 2/19/2025  Date of Discharge:  2/21/2025    Primary Discharge Diagnoses:  Trimalleolar fracture of the right lower extremity    Secondary Discharge Diagnoses:  COPD  GERD  BPH    History of Present Illness (taken from H&P):  Mr. Reynoso is a very pleasant, 84-year-old gentleman who presents after sustaining a fall earlier today.  He reports he was going down to feed the birds when he slipped and went down on his right leg.  He said he heard a \"pop\" and a \"crack\".  He was unable to get back up and actually crawled to the snow, and into his garage where he banged on the door to get his wife's attention.  His wife looked out the window, and could not see him.  He then called for her at which point she opened the door and saw him on the ground.  EMS was summoned.  He reports he can go up and down 2 flights of stairs without any chest pain or dyspnea.     Hospital Course:  Mr. Reynoso was admitted to the medical/surgical unit.  He was seen in consultation by orthopedic surgery and taken for operative repair.  Postoperative course was unremarkable.  Of note, he he did not wish for me to write further pain medicine for him as he is followed by pain management.  He reports that what he is on at home will be perfectly fine.  I will defer this to Dr. Jaycob Jackson.    PCP  Patient Care Team:  Veronica Jerome APRN as PCP - General (Family Medicine)  Jessica, Ja Noriega MD as Consulting Physician (Gastroenterology)    Consults:   Consults       Date and Time Order Name Status Description    2/19/2025  6:35 PM Inpatient Orthopedic Surgery Consult              Operations and Procedures Performed:  Procedure(s):  ANKLE OPEN REDUCTION INTERNAL FIXATION     XR Ankle 3+ View Right    Result Date: 2/19/2025  Narrative: XR ANKLE 3+ VW RIGHT Date of Exam: 2/19/2025 10:43 PM EST Indication: post op 3 view ankle Comparison: " 2/19/2025 Findings: 6 C-arm views of the right ankle. Fluoroscopy time is 51 seconds. Ref air kerma is 0.56 mGy. Images show fixation of the distal fibular fracture with a plate and multiple screws. Medial malleolus fracture has been fixed with 2 screws. There is anatomic alignment of the primary fracture fragments, and the ankle mortise has been restored. There is  a radiolucent syndesmotic anchor.     Impression: Satisfactory ORIF of the ankle. Electronically Signed: Chaka Ware MD  2/19/2025 10:50 PM EST  Workstation ID: [id number]    Peripheral Block    Result Date: 2/19/2025  Narrative: Christine Becker MD     2/19/2025  9:59 PM Peripheral Block Pre-sedation assessment completed: 2/19/2025 9:17 PM Patient reassessed immediately prior to procedure Patient location during procedure: pre-op Start time: 2/19/2025 9:18 PM Stop time: 2/19/2025 9:22 PM Reason for block: at surgeon's request and post-op pain management Performed by Anesthesiologist: Christine Becker MD Preanesthetic Checklist Completed: patient identified, IV checked, site marked, risks and benefits discussed, surgical consent, monitors and equipment checked, pre-op evaluation and timeout performed Prep: Pt Position: left lateral decubitus Sterile barriers:cap, gloves, mask and washed/disinfected hands Prep: ChloraPrep Patient monitoring: blood pressure monitoring, continuous pulse oximetry and EKG Procedure Sedation: yes Performed under: local infiltration Guidance:nerve stimulator Laterality:right Block Type:sciatic Injection Technique:single-shot Needle Type:echogenic Needle Gauge:21 G Medications Used: bupivacaine PF (MARCAINE) injection 0.5% - Injection  17 mL - 2/19/2025 9:22:00 PM dexmedetomidine HCl (PRECEDEX) injection - Perineural  20 mcg - 2/19/2025 9:22:00 PM Post Assessment Injection Assessment: negative aspiration for heme, no paresthesia on injection and incremental injection Patient Tolerance:comfortable throughout block Complications:no  Performed by: Christine Becker MD     Peripheral Block    Result Date: 2/19/2025  Narrative: Christine Becker MD     2/19/2025  9:56 PM Peripheral Block Pre-sedation assessment completed: 2/19/2025 9:08 PM Patient reassessed immediately prior to procedure Patient location during procedure: pre-op Start time: 2/19/2025 9:11 PM Stop time: 2/19/2025 9:16 PM Reason for block: at surgeon's request and post-op pain management Performed by Anesthesiologist: Christine Becker MD Preanesthetic Checklist Completed: patient identified, IV checked, site marked, risks and benefits discussed, surgical consent, monitors and equipment checked, pre-op evaluation and timeout performed Prep: Pt Position: supine Sterile barriers:cap, gloves, mask and washed/disinfected hands Prep: ChloraPrep Patient monitoring: blood pressure monitoring, continuous pulse oximetry and EKG Procedure Sedation: yes Performed under: local infiltration Guidance:ultrasound guided and nerve stimulator ULTRASOUND INTERPRETATION.  Using ultrasound guidance a 21 G gauge needle was placed in close proximity to the femoral nerve, at which point, under ultrasound guidance anesthetic was injected in the area of the nerve and spread of the anesthesia was seen on ultrasound in close proximity thereto.  There were no abnormalities seen on ultrasound; a digital image was taken; and the patient tolerated the procedure with no complications. Images:still images obtained, printed/placed on chart Loss of twitch: 0.5 mA Laterality:right Block Type:femoral Injection Technique:single-shot Needle Type:echogenic Needle Gauge:21 G Resistance on Injection: none Medications Used: bupivacaine PF (MARCAINE) injection 0.5% - Injection  5 mL - 2/19/2025 9:16:00 PM dexmedetomidine HCl (PRECEDEX) injection - Perineural  20 mcg - 2/19/2025 9:16:00 PM Post Assessment Injection Assessment: negative aspiration for heme, no paresthesia on injection and incremental injection Patient Tolerance:comfortable  throughout block Complications:no Performed by: Christine Becker MD     US Sonosite Portable    Result Date: 2/19/2025  Narrative: This procedure was auto-finalized with no dictation required.    XR Ankle 3+ View Right    Result Date: 2/19/2025  Narrative: XR ANKLE 3+ VW RIGHT Date of Exam: 2/19/2025 4:20 PM EST Indication: ankle injury Comparison: None available. Findings: There is an oblique fracture involving the distal fibula extending nearly to the ankle joint with lateral angulation of the distal fracture fragment and up to one shaft width lateral displacement distally. There is a transverse fracture involving the base of the medial malleolus with 1 cm lateral displacement of the distal fracture fragment. There appears to be a posterior malleolar fracture as well. There is lateral posterior subluxation of the talar dome in relation to the tibial plafond. There is associated soft tissue swelling/deformity. There is mild dorsal midfoot degenerative arthrosis.     Impression: Impression: 1.Trimalleolar fracture subluxation. Electronically Signed: Davonte Arechiga MD  2/19/2025 4:25 PM EST  Workstation ID: [id number]    FL Guided NDL Place ASP/INJ/LOC    Result Date: 1/28/2025  Narrative: This procedure was auto-finalized with no dictation required.     Allergies:  is allergic to penicillins, adhesive tape, and morphine.    Rocky  reviewed    Discharge Medications:     Discharge Medications        New Medications        Instructions Start Date   aspirin 81 MG EC tablet   81 mg, Oral, Every 12 Hours Scheduled             Continue These Medications        Instructions Start Date   albuterol 1.25 MG/3ML nebulizer solution  Commonly known as: ACCUNEB   1 ampule, Nebulization, 3 Times Daily - RT      ALBUTEROL IN   2 puffs, Every 4 Hours PRN      alfuzosin 10 MG 24 hr tablet  Commonly known as: UROXATRAL   10 mg, Daily      CITRACAL +D3 PO   1 tablet/day, Oral, Daily      finasteride 5 MG tablet  Commonly known as: PROSCAR   5  mg, Daily      fluticasone 50 MCG/ACT nasal spray  Commonly known as: FLONASE   2 sprays, Nasal, Daily      guaiFENesin 600 MG 12 hr tablet  Commonly known as: MUCINEX   600 mg, 2 Times Daily      lidocaine 5 %  Commonly known as: LIDODERM   1 patch, Every 24 Hours      lidocaine-prilocaine 2.5-2.5 % cream  Commonly known as: EMLA   Topical, As Needed      Magnesium 300 MG capsule   250 mg, 2 Times Daily      naloxone 4 MG/0.1ML nasal spray  Commonly known as: NARCAN   1 spray, Nasal, As Needed      omeprazole 40 MG capsule  Commonly known as: priLOSEC   40 mg, Oral, Daily      oxyCODONE-acetaminophen  MG per tablet  Commonly known as: PERCOCET   1 tablet, Oral, Every 4 Hours PRN, Fill date 2/13/2025      polyethylene glycol 17 GM/SCOOP powder  Commonly known as: MIRALAX   17 g, Oral, Daily      tiotropium bromide-olodaterol 2.5-2.5 MCG/ACT aerosol solution inhaler  Commonly known as: STIOLTO RESPIMAT   2 puffs, Daily - RT               Last Lab Results:   Lab Results (most recent)       Procedure Component Value Units Date/Time    Basic Metabolic Panel [[id number]]  (Abnormal) Collected: 02/21/25 0337    Specimen: Blood Updated: 02/21/25 0456     Glucose 147 mg/dL      BUN 15 mg/dL      Creatinine 0.72 mg/dL      Sodium 135 mmol/L      Potassium 4.0 mmol/L      Chloride 102 mmol/L      CO2 26.0 mmol/L      Calcium 8.5 mg/dL      BUN/Creatinine Ratio 20.8     Anion Gap 7.0 mmol/L      eGFR 91.2 mL/min/1.73     Narrative:      GFR Categories in Chronic Kidney Disease (CKD)      GFR Category          GFR (mL/min/1.73)    Interpretation  G1                     90 or greater         Normal or high (1)  G2                      60-89                Mild decrease (1)  G3a                   45-59                Mild to moderate decrease  G3b                   30-44                Moderate to severe decrease  G4                    15-29                Severe decrease  G5                    14 or less           Kidney  failure          (1)In the absence of evidence of kidney disease, neither GFR category G1 or G2 fulfill the criteria for CKD.    eGFR calculation 2021 CKD-EPI creatinine equation, which does not include race as a factor    CBC & Differential [[id number]]  (Abnormal) Collected: 02/21/25 0337    Specimen: Blood Updated: 02/21/25 0453    Narrative:      The following orders were created for panel order CBC & Differential.  Procedure                               Abnormality         Status                     ---------                               -----------         ------                     CBC Auto Differential[[id number]]        Abnormal            Final result                 Please view results for these tests on the individual orders.    CBC Auto Differential [[id number]]  (Abnormal) Collected: 02/21/25 0337    Specimen: Blood Updated: 02/21/25 0453     WBC 8.61 10*3/mm3      RBC 3.61 10*6/mm3      Hemoglobin 11.3 g/dL      Hematocrit 34.3 %      MCV 95.0 fL      MCH 31.3 pg      MCHC 32.9 g/dL      RDW 11.7 %      RDW-SD 40.5 fl      MPV 10.4 fL      Platelets 158 10*3/mm3      Neutrophil % 68.6 %      Lymphocyte % 10.9 %      Monocyte % 17.8 %      Eosinophil % 2.0 %      Basophil % 0.2 %      Immature Grans % 0.5 %      Neutrophils, Absolute 5.91 10*3/mm3      Lymphocytes, Absolute 0.94 10*3/mm3      Monocytes, Absolute 1.53 10*3/mm3      Eosinophils, Absolute 0.17 10*3/mm3      Basophils, Absolute 0.02 10*3/mm3      Immature Grans, Absolute 0.04 10*3/mm3      nRBC 0.0 /100 WBC     Potassium [[id number]]  (Normal) Collected: 02/19/25 1749    Specimen: Blood Updated: 02/19/25 1805     Potassium 4.0 mmol/L     Comprehensive Metabolic Panel [[id number]]  (Abnormal) Collected: 02/19/25 1657    Specimen: Blood Updated: 02/19/25 1722     Glucose 104 mg/dL      BUN 18 mg/dL      Creatinine 0.86 mg/dL      Sodium 140 mmol/L      Potassium 4.4 mmol/L      Comment: Slight hemolysis detected by analyzer. Result may be  falsely elevated.        Chloride 104 mmol/L      CO2 25.6 mmol/L      Calcium 8.9 mg/dL      Total Protein 7.2 g/dL      Albumin 4.4 g/dL      ALT (SGPT) 14 U/L      AST (SGOT) 23 U/L      Alkaline Phosphatase 62 U/L      Total Bilirubin 0.7 mg/dL      Globulin 2.8 gm/dL      A/G Ratio 1.6 g/dL      BUN/Creatinine Ratio 20.9     Anion Gap 10.4 mmol/L      eGFR 86.5 mL/min/1.73     Narrative:      GFR Categories in Chronic Kidney Disease (CKD)      GFR Category          GFR (mL/min/1.73)    Interpretation  G1                     90 or greater         Normal or high (1)  G2                      60-89                Mild decrease (1)  G3a                   45-59                Mild to moderate decrease  G3b                   30-44                Moderate to severe decrease  G4                    15-29                Severe decrease  G5                    14 or less           Kidney failure          (1)In the absence of evidence of kidney disease, neither GFR category G1 or G2 fulfill the criteria for CKD.    eGFR calculation 2021 CKD-EPI creatinine equation, which does not include race as a factor    Protime-INR [[id number]]  (Abnormal) Collected: 02/19/25 1657    Specimen: Blood Updated: 02/19/25 1722     Protime 12.2 Seconds      INR 0.87    Narrative:      Therapeutic Ranges for INR: 2.0-3.0 (PT 20-30)                              2.5-3.5 (PT 25-34)    CBC & Differential [[id number]]  (Abnormal) Collected: 02/19/25 1657    Specimen: Blood Updated: 02/19/25 1701    Narrative:      The following orders were created for panel order CBC & Differential.  Procedure                               Abnormality         Status                     ---------                               -----------         ------                     CBC Auto Differential[[id number]]        Abnormal            Final result                 Please view results for these tests on the individual orders.    CBC Auto Differential [[id number]]  (Abnormal)  Collected: 02/19/25 1657    Specimen: Blood Updated: 02/19/25 1705     WBC 7.28 10*3/mm3      RBC 4.47 10*6/mm3      Hemoglobin 13.8 g/dL      Hematocrit 42.8 %      MCV 95.7 fL      MCH 30.9 pg      MCHC 32.2 g/dL      RDW 11.6 %      RDW-SD 41.7 fl      MPV 9.7 fL      Platelets 199 10*3/mm3      Neutrophil % 72.7 %      Lymphocyte % 14.6 %      Monocyte % 10.0 %      Eosinophil % 1.9 %      Basophil % 0.3 %      Immature Grans % 0.5 %      Neutrophils, Absolute 5.29 10*3/mm3      Lymphocytes, Absolute 1.06 10*3/mm3      Monocytes, Absolute 0.73 10*3/mm3      Eosinophils, Absolute 0.14 10*3/mm3      Basophils, Absolute 0.02 10*3/mm3      Immature Grans, Absolute 0.04 10*3/mm3           Imaging Results (Most Recent)       Procedure Component Value Units Date/Time    FL C Arm During Surgery [[id number]] Resulted: 02/20/25 1611     Updated: 02/20/25 1611    XR Ankle 3+ View Right [[id number]] Collected: 02/19/25 2248     Updated: 02/19/25 2253    Narrative:      XR ANKLE 3+ VW RIGHT    Date of Exam: 2/19/2025 10:43 PM EST    Indication: post op 3 view ankle    Comparison: 2/19/2025    Findings:  6 C-arm views of the right ankle. Fluoroscopy time is 51 seconds. Ref air kerma is 0.56 mGy.     Images show fixation of the distal fibular fracture with a plate and multiple screws. Medial malleolus fracture has been fixed with 2 screws. There is anatomic alignment of the primary fracture fragments, and the ankle mortise has been restored. There is   a radiolucent syndesmotic anchor.      Impression:      Satisfactory ORIF of the ankle.        Electronically Signed: Chaka Ware MD    2/19/2025 10:50 PM EST    Workstation ID: [id number]    Wac[employer] Portable [[id number]] Resulted: 02/19/25 1937     Updated: 02/19/25 1937    Narrative:      This procedure was auto-finalized with no dictation required.    XR Ankle 3+ View Right [[id number]] Collected: 02/19/25 1623     Updated: 02/19/25 1628    Narrative:      XR ANKLE 3+ VW  RIGHT    Date of Exam: 2/19/2025 4:20 PM EST    Indication: ankle injury    Comparison: None available.    Findings:  There is an oblique fracture involving the distal fibula extending nearly to the ankle joint with lateral angulation of the distal fracture fragment and up to one shaft width lateral displacement distally. There is a transverse fracture involving the   base of the medial malleolus with 1 cm lateral displacement of the distal fracture fragment. There appears to be a posterior malleolar fracture as well. There is lateral posterior subluxation of the talar dome in relation to the tibial plafond. There is   associated soft tissue swelling/deformity. There is mild dorsal midfoot degenerative arthrosis.      Impression:      Impression:  1.Trimalleolar fracture subluxation.      Electronically Signed: Davonte Arechiga MD    2/19/2025 4:25 PM EST    Workstation ID: [id number]            PROCEDURES  Procedure(s):  ANKLE OPEN REDUCTION INTERNAL FIXATION    Condition on Discharge:  Stable    Physical Exam at Discharge  Vital Signs  Temp:  [97.4 °F (36.3 °C)-98.6 °F (37 °C)] 97.4 °F (36.3 °C)  Heart Rate:  [60-82] 60  Resp:  [18-22] 22  BP: (115-149)/(56-77) 126/68    Physical Exam:  Physical Exam   Constitutional: Patient appears well-developed and well-nourished and in no acute distress.  Appears younger than state age.   Cardiovascular: Regular rate, regular rhythm, S1 normal and S2 normal.  Exam reveals no gallop and no friction rub.  No murmur heard.  Pulmonary/Chest: Lungs are clear to auscultation bilaterally. No respiratory distress. No wheezes. No rhonchi. No rales.   Abdominal: Soft. Bowel sounds are normal. There is no tenderness.   Musculoskeletal: Normal Muscle tone  Extremities: No edema.   Neurological: Patient is alert and oriented to person, place, and time. Cranial nerves II-XII are grossly intact with no focal deficits.    Discharge Disposition  Home    Visiting Nurse:    Yes     Home PT/OT:  Yes      Home Safety Evaluation:  Yes     Discharge Diet:      Dietary Orders (From admission, onward)       Start     Ordered    02/20/25 0420  Diet: Regular/House; Fluid Consistency: Thin (IDDSI 0)  Diet Effective Now        References:    Diet Order Definitions   Question Answer Comment   Diets: Regular/House    Fluid Consistency: Thin (IDDSI 0)        02/20/25 0419                    Activity at Discharge:  NWB RLE    Follow-up Appointments  No future appointments.  Additional Instructions for the Follow-ups that You Need to Schedule       Discharge Follow-up with PCP   As directed       Currently Documented PCP:    Veronica Jerome APRN    PCP Phone Number:    [phone]     Follow Up Details: 1-2 weeks        Discharge Follow-up with Specified Provider: Dr. Ying; 2 Weeks   As directed      To: Dr. Ying   Follow Up: 2 Weeks                Test Results Pending at Discharge  None     Junaid Green MD  02/21/25  12:23 EST    Time: <30 minutes

## 2025-02-21 NOTE — NURSING NOTE
Patient pod # 2 orif rt ankle fracture. Non wt bearing for 8 weeks, family at bedside & able to take patient home with available equipment. Anticipate home with family this afternoon.

## 2025-02-21 NOTE — DISCHARGE INSTR - OTHER ORDERS
[facility]  [phone]    Contact [employer] if you would like to rent a knee scooter wheelchair during your recovery.

## 2025-02-21 NOTE — PLAN OF CARE
Goal Outcome Evaluation:  Plan of Care Reviewed With: patient        Progress: improving  Outcome Evaluation: Rested on and off during the night. No complaints of shortness of breath or difficulty breathing. Tolerating 2L via nasal cannula. VSS. Tolerating PRN Percocet.

## 2025-02-21 NOTE — THERAPY DISCHARGE NOTE
"Acute Care - Physical Therapy  Note /Discharge   [facility]     Patient Name: Jose Reynoso  : 1940  MRN: [id number]  Today's Date: 2025                Admit Date: 2025    Visit Dx:    ICD-10-CM ICD-9-CM   1. Closed trimalleolar fracture of right ankle, initial encounter  S82.851A 824.6             PT Note: Attempted PT treatment, patient refuses to participate. Reports he has been up to the bathroom this morning and was able to maintain NWB status with walker, he states \"that was enough.\" Patient adamantly refuses stair training. States he will go up on and down on his bottom, states \"hopping is out of the question.\" Educated patient regarding use of w/c in the home, patient states \"I'm going to get one of those scooters.\" Discussed renting a w/c initially and then transitioning to knee scooter. Patient is receptive to information, discussed with , she will follow up. Patient denies any concern regarding mobility and return home and reports he will follow up with outpatient physical therapy per ortho. Anticipate return home today, no further skilled PT recommendations or needs at this time.                             Time Calculation:    PT Charges       Row Name 25 0922             Time Calculation    Start Time 0830  -BP      Stop Time 0840  -BP      Time Calculation (min) 10 min  -BP      PT Received On 25  -BP                User Key  (r) = Recorded By, (t) = Taken By, (c) = Cosigned By      Initials Name Provider Type    BP Leelee Burk, PA[facility] Physical Therapist                  Therapy Charges for Today       Code Description Service Date Service Provider Modifiers Qty    [id number]  PT CARE PLAN EACH 15 MIN 2025 Leelee Burk, PT GP 1            PT G-Codes  Outcome Measure Options: AM-PAC 6 Clicks Basic Mobility (PT)  AM-PAC 6 Clicks Score (PT): 19    PT Discharge Summary  Anticipated Discharge Disposition (PT): home with assist  Reason for " Discharge: Patient/Caregiver request (refuses follow up treatment)  Outcomes Achieved:  (did not meet any goals, seen for initial evaluation only)  Discharge Destination: Home    Leelee Burk, PT  2/21/2025

## 2025-02-22 NOTE — OUTREACH NOTE
Prep Survey      Flowsheet Row Responses   Scientologist facility patient discharged from? [place]   Is LACE score < 7 ? No   Eligibility [facility] [place]   Date of Admission 02/19/25   Date of Discharge 02/21/25   Discharge Disposition Home-Health Care [facility]   Discharge diagnosis Closed trimalleolar fracture of right ankle,  ANKLE OPEN REDUCTION INTERNAL FIXATION   Does the patient have one of the following disease processes/diagnoses(primary or secondary)? General Surgery   Does the patient have Home health ordered? Yes   What is the Home health agency?  [facility]    Is there a DME ordered? No   Prep survey completed? Yes            Giuliana GROVER - Registered Nurse

## 2025-02-24 ENCOUNTER — TRANSITIONAL CARE MANAGEMENT TELEPHONE ENCOUNTER (OUTPATIENT)
Dept: CALL CENTER | Facility: HOSPITAL | Age: 85
End: 2025-02-24
Payer: MEDICARE

## 2025-02-24 NOTE — OUTREACH NOTE
Call Center TCM Note      Flowsheet Row Responses   [employer] facility patient discharged from? [place]   Does the patient have one of the following disease processes/diagnoses(primary or secondary)? General Surgery   TCM attempt successful? No   Unsuccessful attempts Attempt 1  [ PCP verbal release incomplete, nothing checked.]            Jordana Lin RN    2/24/2025, 10:58 HYUN ZAPIEN - Registered Nurse

## 2025-02-24 NOTE — OUTREACH NOTE
Call Center TCM Note      Flowsheet Row Responses   [facility] patient discharged from? [place]   Does the patient have one of the following disease processes/diagnoses(primary or secondary)? General Surgery   TCM attempt successful? Yes   Call start time 1628   Call end time 1635   Discharge diagnosis Closed trimalleolar fracture of right ankle,  ANKLE OPEN REDUCTION INTERNAL FIXATION   Is patient permission given to speak with other caregiver? No   Person spoke with today (if not patient) and relationship Patient   Medication alerts for this patient Aspirin   Meds reviewed with patient/caregiver? Yes   Is the patient having any side effects they believe may be caused by any medication additions or changes? No   Does the patient have all medications related to this admission filled (includes all antibiotics, pain medications, etc.) Yes   Prescription comments No questions or concerns with medications.   Is the patient taking all medications as directed (includes completed medication regime)? Yes   Comments Patient declined scheduling a [facility] f/u appt at time of call. He states he will f/u with ortho for now, it is difficult to get around with cast on leg.   Does the patient have an appointment with their PCP within 7-14 days of discharge? No   Nursing Interventions Patient declined scheduling/rescheduling appointment at this time, Patient desires to follow up with specialty only   What is the Home health agency?  [facility]    Has home health visited the patient within 72 hours of discharge? Yes   Home health comments PT visited yesterday per patient   Psychosocial issues? No   Comments Patient states he is in the cast, toes have good color. No numbness or tingling.   Did the patient receive a copy of their discharge instructions? Yes   Nursing interventions Reviewed instructions with patient   What is the patient's perception of their health status since discharge? Improving   Is the patient  /caregiver able to teach back basic post-op care? No tub bath, swimming, or hot tub until instructed by MD, Keep incision areas clean,dry and protected  [Patient is in a cast, incision not visible.]   Is the patient/caregiver able to teach back signs and symptoms of incisional infection? Fever   Is the patient/caregiver able to teach back steps to recovery at home? Set small, achievable goals for return to baseline health, Rest and rebuild strength, gradually increase activity, Make a list of questions for surgeon's appointment   If the patient is a current smoker, are they able to teach back resources for cessation? Not a smoker   Is the patient/caregiver able to teach back the hierarchy of who to call/visit for symptoms/problems? PCP, Specialist, Home health nurse, Urgent Care, ED, 911 Yes   TCM call completed? Yes   Wrap up additional comments Patient declined scheduling a [facility] f/u appt at time of call. He states he will f/u with ortho for now, it is difficult to get around with cast on leg.   Call end time 1635   Would this patient benefit from a Referral to [facility] Social Work? No   Is the patient interested in additional calls from an ambulatory ? No            Jordana Lin, RN    2/24/2025, 16:38 HYUN ZAPIEN - Registered Nurse

## 2025-03-04 ENCOUNTER — OFFICE VISIT (OUTPATIENT)
Dept: ORTHOPEDIC SURGERY | Facility: CLINIC | Age: 85
End: 2025-03-04
Payer: MEDICARE

## 2025-03-04 ENCOUNTER — READMISSION MANAGEMENT (OUTPATIENT)
Dept: CALL CENTER | Facility: HOSPITAL | Age: 85
End: 2025-03-04
Payer: MEDICARE

## 2025-03-04 VITALS
HEART RATE: 71 BPM | WEIGHT: 220 LBS | DIASTOLIC BLOOD PRESSURE: 73 MMHG | SYSTOLIC BLOOD PRESSURE: 113 MMHG | BODY MASS INDEX: 31.5 KG/M2 | HEIGHT: 70 IN

## 2025-03-04 DIAGNOSIS — M25.571 RIGHT ANKLE PAIN, UNSPECIFIED CHRONICITY: Primary | ICD-10-CM

## 2025-03-04 DIAGNOSIS — Z98.890 HISTORY OF OPEN REDUCTION AND INTERNAL FIXATION (ORIF) PROCEDURE: ICD-10-CM

## 2025-03-04 NOTE — OUTREACH NOTE
General Surgery Week 2 Survey      Flowsheet Row Responses   [facility] patient discharged from? [place]   Does the patient have one of the following disease processes/diagnoses(primary or secondary)? General Surgery   Week 2 attempt successful? Yes   Call start time 0834   Call end time 0838   Discharge diagnosis Closed trimalleolar fracture of right ankle,  ANKLE OPEN REDUCTION INTERNAL FIXATION   Meds reviewed with patient/caregiver? Yes   Is the patient taking all medications as directed (includes completed medication regime)? Yes   Does the patient have a follow up appointment scheduled with their surgeon? Yes   Has the patient kept scheduled appointments due by today? N/A   What is the Home health agency?  [facility]   Has home health visited the patient within 72 hours of discharge? Yes   Psychosocial issues? No   Did the patient receive a copy of their discharge instructions? Yes   Nursing interventions Reviewed instructions with patient   What is the patient's perception of their health status since discharge? Improving   Nursing interventions Nurse provided patient education   Is the patient/caregiver able to teach back signs and symptoms of incisional infection? Fever   Is the patient/caregiver able to teach back steps to recovery at home? Eat a well-balance diet   Is the patient/caregiver able to teach back the hierarchy of who to call/visit for symptoms/problems? PCP, Specialist, Home health nurse, Urgent Care, ED, 911 Yes   Week 2 call completed? Yes   Graduated Yes   Graduated/Revoked comments Pt has post op appt today. Pt reports he is doing ok and therapy is still coming out. Wound is covered but no s/s of infection such as fever.   Call end time 0838            SERVANDO ADAME - Registered Nurse

## 2025-03-04 NOTE — PROGRESS NOTES
Subjective: Status post ORIF right trimalleolar ankle fracture     Patient ID: Jose Reynoso is a 84 y.o. male.    Chief Complaint:    History of Present Illness 84-year-old male is 2 weeks out and is doing fairly well.  Ambulating nonweightbearing with crutches which is easier for him than a walker.  Apparently the patient is a pain management and the pain medicine is controlling the ankle pain.     Social History     Occupational History    Not on file   Tobacco Use    Smoking status: Former     Current packs/day: 0.00     Average packs/day: 1 pack/day for 36.0 years (36.0 ttl pk-yrs)     Types: Cigarettes     Start date: 1951     Quit date: 1987     Years since quittin.1     Passive exposure: Past    Smokeless tobacco: Never   Vaping Use    Vaping status: Never Used   Substance and Sexual Activity    Alcohol use: No    Drug use: No    Sexual activity: Not Currently     Partners: Female      Review of Systems      Past Medical History:   Diagnosis Date    Cancer     left lung dx     Cervical disc disorder     Chronic pain disorder     Neck, back, chest, legs    Colon polyp     COPD (chronic obstructive pulmonary disease)     Extremity pain     Fractures 1983    Back    GERD (gastroesophageal reflux disease)     Headache     Injury of back     Joint pain Hip    Low back pain     Lumbosacral disc disease     Neck pain     Peripheral neuropathy     Shingles      Past Surgical History:   Procedure Laterality Date    ANKLE OPEN REDUCTION INTERNAL FIXATION Right 2025    Procedure: ANKLE OPEN REDUCTION INTERNAL FIXATION;  Surgeon: Ernie Ying MD;  Location: [facility] OR;  Service: Orthopedics;  Laterality: Right;    APPENDECTOMY      CERVICAL FUSION      CHOLECYSTECTOMY      COLONOSCOPY N/A 2018    Procedure: COLONOSCOPY with polypectomy;  Surgeon: Ja Lopez MD;  Location: [facility] OR;  Service: Gastroenterology    COLONOSCOPY W/ POLYPECTOMY N/A 2023    Procedure:  COLONOSCOPY WITH POLYPECTOMY;  Surgeon: Ja Lopez MD;  Location:  [facility] OR;  Service: Gastroenterology;  Laterality: N/A;  Diverticulosis; Sigmoid polyp x 4; Rectal polyp x 2    ENDOSCOPY N/A 07/05/2019    Procedure: ESOPHAGOGASTRODUODENOSCOPY w/ biopsies;  Surgeon: Ja Lopez MD;  Location:  [facility] OR;  Service: Gastroenterology    EPIDURAL BLOCK      LAMINECTOMY      NECK SURGERY      SPINAL FUSION      SPINE SURGERY      STEROID INJECTION HIP Right 09/19/2024    Procedure: right hip injection 20610;  Surgeon: Reid [facility] MD;  [facility]: [facility];  Service: Pain Management;  Laterality: Right;    STEROID INJECTION HIP Right 01/28/2025    Procedure: right hip injection 20610;  Surgeon: Reid [facility] MD;  Location: [facility];  Service: Pain Management;  Laterality: Right;    THORACIC EPIDURAL N/A 11/12/2024    Procedure: thoracic epidural steroid injection at approximately T7/8 71688;  Surgeon: Reid [facility] MD;  Location: [facility];  Service: Pain Management;  Laterality: N/A;     Family History   Problem Relation Age of Onset    Colon cancer Neg Hx     Colon polyps Neg Hx          Objective:  Vitals:    03/04/25 1045   BP: 113/73   Pulse: 71         03/04/25  1045   Weight: 99.8 kg (220 lb)     Body mass index is 31.57 kg/m².  BMI is >= 30 and <35. (Class 1 Obesity). The following options were offered after discussion;:          Ortho Exam   AP lateral oblique view of the right ankle shows anatomic alignment of the ankle.  He is alert and oriented x 3.  His wounds are benign.  Minimal to no swelling and no drainage from the incisions.  No motor or sensory deficit.  His calf is nontender.    Assessment:        1. Right ankle pain, unspecified chronicity    2. History of open reduction and internal fixation (ORIF) procedure           Plan: The x-rays with the patient.  Continue the aspirin.  Placed in a tall fracture boot and he can remove for range of  motion exercises but he is to remain nonweightbearing till seen by Dr. Ying 2 weeks and decision about weights bearing status can be made at that time.            Work Status:    KEHSA query complete.    Orders:  Orders Placed This Encounter   Procedures    XR Ankle 3+ View Right       Medications:  No orders of the defined types were placed in this encounter.      Followup:  Return in about 2 weeks (around 3/18/2025).          Dictated utilizing Dragon dictation

## 2025-03-14 ENCOUNTER — TELEMEDICINE (OUTPATIENT)
Dept: PAIN MEDICINE | Facility: CLINIC | Age: 85
End: 2025-03-14
Payer: MEDICARE

## 2025-03-14 DIAGNOSIS — M25.571 CHRONIC PAIN OF RIGHT ANKLE: ICD-10-CM

## 2025-03-14 DIAGNOSIS — M96.1 POSTLAMINECTOMY SYNDROME, CERVICAL REGION: Primary | ICD-10-CM

## 2025-03-14 DIAGNOSIS — M54.16 LUMBAR RADICULOPATHY: ICD-10-CM

## 2025-03-14 DIAGNOSIS — Z79.899 ENCOUNTER FOR LONG-TERM (CURRENT) USE OF HIGH-RISK MEDICATION: ICD-10-CM

## 2025-03-14 DIAGNOSIS — G89.4 CHRONIC PAIN SYNDROME: ICD-10-CM

## 2025-03-14 DIAGNOSIS — G89.3 CANCER RELATED PAIN: ICD-10-CM

## 2025-03-14 DIAGNOSIS — M96.1 POSTLAMINECTOMY SYNDROME, CERVICAL REGION: ICD-10-CM

## 2025-03-14 DIAGNOSIS — G89.29 CHRONIC PAIN OF RIGHT ANKLE: ICD-10-CM

## 2025-03-14 RX ORDER — OXYCODONE AND ACETAMINOPHEN 10; 325 MG/1; MG/1
1 TABLET ORAL EVERY 4 HOURS PRN
Qty: 180 TABLET | Refills: 0 | Status: SHIPPED | OUTPATIENT
Start: 2025-03-14
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
## 2025-03-18 ENCOUNTER — OFFICE VISIT (OUTPATIENT)
Dept: ORTHOPEDIC SURGERY | Facility: CLINIC | Age: 85
End: 2025-03-18
Payer: MEDICARE

## 2025-03-18 DIAGNOSIS — Z98.890 HISTORY OF OPEN REDUCTION AND INTERNAL FIXATION (ORIF) PROCEDURE: Primary | ICD-10-CM

## 2025-03-18 PROCEDURE — 99024 POSTOP FOLLOW-UP VISIT: CPT | Performed by: INTERNAL MEDICINE

## 2025-03-18 NOTE — PROGRESS NOTES
Subjective:     Patient ID: Jose Reynoso is a 84 y.o. male.    Chief Complaint:    History of Present Illness  Jose Reynoso returns to clinic today for evaluation of ankle status post ORIF 4 weeks ago on 2025.  The patient is doing better.  He denies any fever chills or drainage from his surgical incision.  He is happy with results so far.  He has been nonweightbearing in the cam boot.  He has not begun physical therapy.  He is in a wheelchair today.     Social History     Occupational History    Not on file   Tobacco Use    Smoking status: Former     Current packs/day: 0.00     Average packs/day: 1 pack/day for 36.0 years (36.0 ttl pk-yrs)     Types: Cigarettes     Start date: 1951     Quit date: 1987     Years since quittin.2     Passive exposure: Past    Smokeless tobacco: Never   Vaping Use    Vaping status: Never Used   Substance and Sexual Activity    Alcohol use: No    Drug use: No    Sexual activity: Not Currently     Partners: Female      Past Medical History:   Diagnosis Date    Cancer     left lung dx     Cervical disc disorder     Cholelithiasis     Removed    Chronic pain disorder     Neck, back, chest, legs    Colon polyp     COPD (chronic obstructive pulmonary disease)     Extremity pain     Fractures 1983    Back    GERD (gastroesophageal reflux disease)     Headache     Hernia     Injury of back     Joint pain Hip    Low back pain     Lumbosacral disc disease     Neck pain     Peripheral neuropathy     Shingles     Sleep apnea      Past Surgical History:   Procedure Laterality Date    ANKLE OPEN REDUCTION INTERNAL FIXATION Right 2025    Procedure: ANKLE OPEN REDUCTION INTERNAL FIXATION;  Surgeon: Ernie Ying MD;  Location: [facility];  Service: Orthopedics;  Laterality: Right;    APPENDECTOMY      CERVICAL FUSION      CHOLECYSTECTOMY      COLONOSCOPY N/A 2018    Procedure: COLONOSCOPY with polypectomy;  Surgeon: Ja Lopez MD;  Location:   [facility] OR;  Service: Gastroenterology    COLONOSCOPY W/ POLYPECTOMY N/A 05/01/2023    Procedure: COLONOSCOPY WITH POLYPECTOMY;  Surgeon: Ja Lopez MD;  Location: [facility] OR;  Service: Gastroenterology;  Laterality: N/A;  Diverticulosis; Sigmoid polyp x 4; Rectal polyp x 2    ENDOSCOPY N/A 07/05/2019    Procedure: ESOPHAGOGASTRODUODENOSCOPY w/ biopsies;  Surgeon: Ja Lopez MD;  Location: [facility] OR;  Service: Gastroenterology    EPIDURAL BLOCK      LAMINECTOMY      NECK SURGERY      SPINAL FUSION      SPINE SURGERY      STEROID INJECTION HIP Right 09/19/2024    Procedure: right hip injection 20610;  Surgeon: Reid [facility] MD;  [facility];  Service: Pain Management;  Laterality: Right;    STEROID INJECTION HIP Right 01/28/2025    Procedure: right hip injection 20610;  Surgeon: Reid [facility] MD;  [facility]: [facility];  Service: Pain Management;  Laterality: Right;    THORACIC EPIDURAL N/A 11/12/2024    Procedure: thoracic epidural steroid injection at approximately T7/8 60226;  Surgeon: Reid [facility] MD;  Location: [facility];  Service: Pain Management;  Laterality: N/A;    UPPER GASTROINTESTINAL ENDOSCOPY         Family History   Problem Relation Age of Onset    Colon cancer Neg Hx     Colon polyps Neg Hx                  Objective:  There were no vitals filed for this visit.  There were no vitals filed for this visit.  There is no height or weight on file to calculate BMI.           Right Ankle Exam     Tenderness   Right ankle tenderness location: global mild.  Swelling: mild    Range of Motion   Dorsiflexion:  abnormal   Plantar flexion:  abnormal   Eversion:  abnormal   Inversion:  abnormal     Muscle Strength   Dorsiflexion:  3/5  Plantar flexion:  3/5  Anterior tibial:  3/5  Posterior tibial:  3/5  Gastrocsoleus:  3/5  Peroneal muscle:  3/5    Other   Erythema: absent  Scars: present  Sensation: normal  Pulse: present                       Imaging: 3 views of the right ankle were ordered and reviewed by myself in the office today  Indication: Right ankle fracture  Findings: X-rays demonstrate a right trimalleolar ankle fracture status post ORIF with implants in expected position.  There is no signs of failure fixation or loss of reduction.  Ankle spaces appear intact and concentrically reduced.  Comparative studies: 2-week postop x-rays    Assessment:        1. History of open reduction and internal fixation (ORIF) procedure           Plan:          Discussed treatment options at length with patient at today's visit.  Discussed with patient that I like him to continue to wear the cam boot.  On 4/2/2025 which will be 6 weeks postop would like him to advance to weightbearing as tolerated in the cam boot with a walker or a cane.  I will refer the patient to physical therapy today.  I like him to keep a close eye on his incisions.  If he starts to notice any increasing redness pain fevers chills or drainage please call our office immediately.  I will plan to see the patient back in 3 weeks and hopefully at that time we can discontinue the cam boot.  Follow up: 3 weeks with 3 views of the right ankle      Jose Reynoso was in agreement with plan and had all questions answered.     Medications:  No orders of the defined types were placed in this encounter.      Followup:  No follow-ups on file.    Diagnoses and all orders for this visit:    1. History of open reduction and internal fixation (ORIF) procedure (Primary)  -     XR Ankle 3+ View Right          Dictated utilizing Dragon dictation

## 2025-04-07 ENCOUNTER — TELEPHONE (OUTPATIENT)
Dept: ORTHOPEDIC SURGERY | Facility: CLINIC | Age: 85
End: 2025-04-07

## 2025-04-07 NOTE — TELEPHONE ENCOUNTER
Hub staff attempted to follow warm transfer process and was unsuccessful     Caller: Jose Reynoso    Relationship to patient: Self    Best call back number: [phone]    Patient is needing: PATIENT WOULD LIKE TO KNOW IF HE CAN DO A VIDEO VISIT FOR TOMORROW STILL CANNOT PUT WEIGHT

## 2025-04-08 ENCOUNTER — OFFICE VISIT (OUTPATIENT)
Dept: ORTHOPEDIC SURGERY | Facility: CLINIC | Age: 85
End: 2025-04-08
Payer: MEDICARE

## 2025-04-08 DIAGNOSIS — Z98.890 HISTORY OF OPEN REDUCTION AND INTERNAL FIXATION (ORIF) PROCEDURE: Primary | ICD-10-CM

## 2025-04-08 PROCEDURE — 99024 POSTOP FOLLOW-UP VISIT: CPT | Performed by: INTERNAL MEDICINE

## 2025-04-08 NOTE — PROGRESS NOTES
Subjective:     Patient ID: Jose Reynoso is a 84 y.o. male.    Chief Complaint:    History of Present Illness  Jose Reynoso returns to clinic today for evaluation of ankle status post ORIF 7 weeks ago on 2025.  The patient is doing better.  He denies any fever chills or drainage from his surgical incision.  He is happy with results so far.  He has been nonweightbearing in the cam boot.  He has not begun physical therapy.  He is in a wheelchair today.     Social History     Occupational History    Not on file   Tobacco Use    Smoking status: Former     Current packs/day: 0.00     Average packs/day: 1 pack/day for 36.0 years (36.0 ttl pk-yrs)     Types: Cigarettes     Start date: 1951     Quit date: 1987     Years since quittin.2     Passive exposure: Past    Smokeless tobacco: Never   Vaping Use    Vaping status: Never Used   Substance and Sexual Activity    Alcohol use: No    Drug use: No    Sexual activity: Not Currently     Partners: Female      Past Medical History:   Diagnosis Date    Arthritis of back     Cancer     left lung dx     Cervical disc disorder     Cholelithiasis     Removed    Chronic pain disorder     Neck, back, chest, legs    Colon polyp     COPD (chronic obstructive pulmonary disease)     Extremity pain     Fracture of ankle 894676    Fracture, foot 213404    Fractures 1983    Back    GERD (gastroesophageal reflux disease)     Headache     Hernia     Injury of back     Joint pain Hip    Low back pain     Lumbosacral disc disease     Neck pain     Peripheral neuropathy     Shingles     Sleep apnea      Past Surgical History:   Procedure Laterality Date    ANKLE OPEN REDUCTION INTERNAL FIXATION Right 2025    Procedure: ANKLE OPEN REDUCTION INTERNAL FIXATION;  Surgeon: Ernie Ying MD;  Location: [facility];  Service: Orthopedics;  Laterality: Right;    APPENDECTOMY      CERVICAL FUSION      CHOLECYSTECTOMY      COLONOSCOPY N/A 2018    Procedure:  COLONOSCOPY with polypectomy;  Surgeon: Ja Lopez MD;  Location: [facility] OR;  Service: Gastroenterology    COLONOSCOPY W/ POLYPECTOMY N/A 05/01/2023    Procedure: COLONOSCOPY WITH POLYPECTOMY;  Surgeon: Ja Lopez MD;  Location: [facility] OR;  Service: Gastroenterology;  Laterality: N/A;  Diverticulosis; Sigmoid polyp x 4; Rectal polyp x 2    ENDOSCOPY N/A 07/05/2019    Procedure: ESOPHAGOGASTRODUODENOSCOPY w/ biopsies;  Surgeon: Ja Lopez MD;  Location: [facility] OR;  Service: Gastroenterology    EPIDURAL BLOCK      FOOT SURGERY  [id number]    LAMINECTOMY      NECK SURGERY      SPINAL FUSION      SPINE SURGERY      STEROID INJECTION HIP Right 09/19/2024    Procedure: right hip injection 20610;  Surgeon: Reid Jackson MD;  Location: [facility];  Service: Pain Management;  Laterality: Right;    STEROID INJECTION HIP Right 01/28/2025    Procedure: right hip injection 20610;  Surgeon: Reid Jackson MD;  Location: [facility];  Service: Pain Management;  Laterality: Right;    THORACIC EPIDURAL N/A 11/12/2024    Procedure: thoracic epidural steroid injection at approximately T7/8 70590;  Surgeon: Reid Jackson MD;  Location: [facility];  Service: Pain Management;  Laterality: N/A;    UPPER GASTROINTESTINAL ENDOSCOPY         Family History   Problem Relation Age of Onset    Colon cancer Neg Hx     Colon polyps Neg Hx                  Objective:  There were no vitals filed for this visit.  There were no vitals filed for this visit.  There is no height or weight on file to calculate BMI.           Right Ankle Exam     Tenderness   Right ankle tenderness location: global mild.  Swelling: mild    Range of Motion   Dorsiflexion:  abnormal   Plantar flexion:  abnormal   Eversion:  abnormal   Inversion:  abnormal     Muscle Strength   Dorsiflexion:  4/5  Plantar flexion:  4/5  Anterior tibial:  4/5  Posterior tibial:  4/5  Gastrocsoleus:  4/5  Peroneal muscle:   4/5    Other   Erythema: absent  Scars: present  Sensation: normal  Pulse: present                            Imaging: 3 views of the right ankle were ordered and reviewed by myself in the office today  Indication: Right ankle fracture  Findings: X-rays demonstrate a right trimalleolar ankle fracture status post ORIF with implants in expected position.  There is no signs of failure fixation or loss of reduction.  Ankle spaces appear intact and concentrically reduced. Fractures are healing  Comparative studies: 3/18/25 x-rays    Assessment:        1. History of open reduction and internal fixation (ORIF) procedure           Plan:          Discussed treatment options at length with patient at today's visit.  Discussed with patient that I like him to continue to wear the cam boot.  I would like him to continue with physical therapy.  I will plan to advance him to weightbearing as tolerated in the cam boot today.  I like him to keep a close eye on his incisions.  If he starts to notice any increasing redness pain fevers chills or drainage please call our office immediately.  I will plan to see the patient back in 3 weeks and hopefully at that time we can discontinue the cam boot.  Follow up: 3 weeks with 3 views of the right ankle      Jose Reynoso was in agreement with plan and had all questions answered.     Medications:  No orders of the defined types were placed in this encounter.      Followup:  No follow-ups on file.    Diagnoses and all orders for this visit:    1. History of open reduction and internal fixation (ORIF) procedure (Primary)  -     XR Ankle 3+ View Right          Dictated utilizing Dragon dictation

## 2025-04-09 ENCOUNTER — TELEPHONE (OUTPATIENT)
Dept: PAIN MEDICINE | Facility: CLINIC | Age: 85
End: 2025-04-09
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
## 2025-04-14 ENCOUNTER — TELEMEDICINE (OUTPATIENT)
Dept: PAIN MEDICINE | Facility: CLINIC | Age: 85
End: 2025-04-14
Payer: MEDICARE

## 2025-04-14 DIAGNOSIS — G89.3 CANCER RELATED PAIN: ICD-10-CM

## 2025-04-14 DIAGNOSIS — M96.1 POSTLAMINECTOMY SYNDROME, CERVICAL REGION: ICD-10-CM

## 2025-04-14 DIAGNOSIS — M54.16 LUMBAR RADICULOPATHY: Primary | ICD-10-CM

## 2025-04-14 DIAGNOSIS — M25.551 RIGHT HIP PAIN: ICD-10-CM

## 2025-04-14 DIAGNOSIS — B02.29 POST HERPETIC NEURALGIA: ICD-10-CM

## 2025-04-14 DIAGNOSIS — M79.2 NEUROPATHIC PAIN OF CHEST: ICD-10-CM

## 2025-04-14 DIAGNOSIS — M54.16 LUMBAR RADICULOPATHY: ICD-10-CM

## 2025-04-14 DIAGNOSIS — Z79.899 ENCOUNTER FOR LONG-TERM (CURRENT) USE OF HIGH-RISK MEDICATION: ICD-10-CM

## 2025-04-14 PROCEDURE — 99214 OFFICE O/P EST MOD 30 MIN: CPT | Performed by: NURSE PRACTITIONER

## 2025-04-14 PROCEDURE — 1125F AMNT PAIN NOTED PAIN PRSNT: CPT | Performed by: NURSE PRACTITIONER

## 2025-04-14 RX ORDER — OXYCODONE AND ACETAMINOPHEN 10; 325 MG/1; MG/1
1 TABLET ORAL EVERY 4 HOURS PRN
Qty: 180 TABLET | Refills: 0 | Status: SHIPPED | OUTPATIENT
Start: 2025-04-14
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
## 2025-05-06 ENCOUNTER — OFFICE VISIT (OUTPATIENT)
Dept: ORTHOPEDIC SURGERY | Facility: CLINIC | Age: 85
End: 2025-05-06
Payer: MEDICARE

## 2025-05-06 ENCOUNTER — TELEPHONE (OUTPATIENT)
Dept: PAIN MEDICINE | Facility: CLINIC | Age: 85
End: 2025-05-06

## 2025-05-06 VITALS — BODY MASS INDEX: 31.5 KG/M2 | WEIGHT: 220 LBS | HEIGHT: 70 IN

## 2025-05-06 DIAGNOSIS — Z98.890 HISTORY OF OPEN REDUCTION AND INTERNAL FIXATION (ORIF) PROCEDURE: Primary | ICD-10-CM

## 2025-05-06 NOTE — PROGRESS NOTES
Subjective:     Patient ID: Jose Reynoso is a 84 y.o. male.    Chief Complaint:    History of Present Illness  Jose Reynoso returns to clinic today for evaluation of right ankle status post ORIF performed by myself  11 weeks  ago.  He is attending physical therapy and progressing well.  The patient denies any fevers chills or drainage from their surgical incision. He is happy with the result so far. He states that the pain and swelling are improving.  He is ambulatory with a crutch and a boot today.  He states it is still somewhat swollen and is tender laterally over the skin.     Social History     Occupational History    Not on file   Tobacco Use    Smoking status: Former     Current packs/day: 0.00     Average packs/day: 1 pack/day for 36.0 years (36.0 ttl pk-yrs)     Types: Cigarettes     Start date: 1951     Quit date: 1987     Years since quittin.3     Passive exposure: Past    Smokeless tobacco: Never   Vaping Use    Vaping status: Never Used   Substance and Sexual Activity    Alcohol use: No    Drug use: No    Sexual activity: Not Currently     Partners: Female      Past Medical History:   Diagnosis Date    Arthritis of back     Cancer     left lung dx     Cervical disc disorder     Cholelithiasis     Removed    Chronic pain disorder     Neck, back, chest, legs    Colon polyp     COPD (chronic obstructive pulmonary disease)     Extremity pain     Fracture of ankle [id number]    Fracture, foot 029407    Fractures 1983    Back    GERD (gastroesophageal reflux disease)     Headache     Hernia     Injury of back     Joint pain Hip    Low back pain     Lumbosacral disc disease     Neck pain     Peripheral neuropathy     Shingles     Sleep apnea      Past Surgical History:   Procedure Laterality Date    ANKLE OPEN REDUCTION INTERNAL FIXATION Right 2025    Procedure: ANKLE OPEN REDUCTION INTERNAL FIXATION;  Surgeon: Ernie Ying MD;  Location: [facility];  Service: Orthopedics;   "Laterality: Right;    APPENDECTOMY      CERVICAL FUSION      CHOLECYSTECTOMY      COLONOSCOPY N/A 03/23/2018    Procedure: COLONOSCOPY with polypectomy;  Surgeon: Ja Lopez MD;  Location:  [facility];  Service: Gastroenterology    COLONOSCOPY W/ POLYPECTOMY N/A 05/01/2023    Procedure: COLONOSCOPY WITH POLYPECTOMY;  Surgeon: Ja Lopez MD;  Location:  [facility];  Service: Gastroenterology;  Laterality: N/A;  Diverticulosis; Sigmoid polyp x 4; Rectal polyp x 2    ENDOSCOPY N/A 07/05/2019    Procedure: ESOPHAGOGASTRODUODENOSCOPY w/ biopsies;  Surgeon: Ja Lopez MD;  Location:  [facility];  Service: Gastroenterology    EPIDURAL BLOCK      FOOT SURGERY  [id number]    LAMINECTOMY      NECK SURGERY      SPINAL FUSION      SPINE SURGERY      STEROID INJECTION HIP Right 09/19/2024    Procedure: right hip injection 20610;  Surgeon: Reid Jackson MD;  Location: [facility];  Service: Pain Management;  Laterality: Right;    STEROID INJECTION HIP Right 01/28/2025    Procedure: right hip injection 20610;  Surgeon: Reid Jackson MD;  Location: [facility];  Service: Pain Management;  Laterality: Right;    THORACIC EPIDURAL N/A 11/12/2024    Procedure: thoracic epidural steroid injection at approximately T7/8 22309;  Surgeon: Reid Jackson MD;  Location: [facility];  Service: Pain Management;  Laterality: N/A;    UPPER GASTROINTESTINAL ENDOSCOPY         Family History   Problem Relation Age of Onset    Colon cancer Neg Hx     Colon polyps Neg Hx                  Objective:  Vitals:    05/06/25 1335   Weight: 99.8 kg (220 lb)   Height: 177.8 cm (70\")         05/06/25  1335   Weight: 99.8 kg (220 lb)     Body mass index is 31.57 kg/m².           Right Ankle Exam     Tenderness   The patient is experiencing no tenderness.  Swelling: mild    Range of Motion   Dorsiflexion:  abnormal   Plantar flexion:  abnormal   Eversion:  abnormal   Inversion:  abnormal     Muscle " Strength   Dorsiflexion:  4/5  Plantar flexion:  4/5  Anterior tibial:  4/5  Posterior tibial:  4/5  Gastrocsoleus:  4/5  Peroneal muscle:  4/5    Other   Erythema: absent  Scars: present  Sensation: normal  Pulse: present                Imaging: 3 views of the right ankle were ordered and reviewed by myself in the office today  Indication: Right ankle fracture  Findings: X-rays demonstrate a right trimalleolar ankle fracture status post ORIF with implants in expected position.  There is no signs of failure fixation or loss of reduction.  Ankle spaces appear intact and concentrically reduced. Fractures are healing  Comparative studies:  x-rays at last visit    Assessment:        1. History of open reduction and internal fixation (ORIF) procedure           Plan:          Discussed treatment options at length with patient at today's visit. I discussed with the patient that they are doing well from my standpoint.  The patient is ambulatory today with his boot and crutch.  He may discontinue the boot today.  I discussed with them that it is important to continue working on strengthening and range of motion exercises.  They should continue to attend physical therapy until they are discharged by the therapist or until they feel like they are no longer making improvements.  I discussed with them that it is normal to have stiffness achiness and pain particularly at night and in the morning.  This will continue to improve as time goes on and may continue to improve for 6 to 12 months postoperatively. The patient's surgical incision is healed without signs of infection.  It is now okay for the patient to soak or submerge the surgical incision underwater.  They should clean the incision daily with soapy water in the shower.  I would like the patient to notify our office immediately if they note any increasing redness, pain, fevers, chills, or drainage of any kind from their surgical incision as this would be abnormal at this  point in time.  I offered the patient a narcotic refill.  They may discontinue the anticoagulant from my standpoint unless prescribed by another provider prior to surgery.  I would like to see the patient back in 3 months for follow-up appointment.  The patient voiced understanding and agreement with the plan.   Follow up: 3 months with 3 views of the right ankle      Jose Reynoso was in agreement with plan and had all questions answered.     Medications:  No orders of the defined types were placed in this encounter.      Followup:  No follow-ups on file.    Diagnoses and all orders for this visit:    1. History of open reduction and internal fixation (ORIF) procedure (Primary)  -     XR Ankle 3+ View Right          Dictated utilizing Dragon dictation

## 2025-05-06 NOTE — TELEPHONE ENCOUNTER
Caller: Jose Reynoso    Relationship: Self    Best call back number: [phone]    What is the best time to reach you: ANYTIME     Who are you requesting to speak with (clinical staff, provider,  specific staff member): CLINICAL STAFF     Do you know the name of the person who called: NO    What was the call regarding: MISSED A CALL FROM THE OFFICE.  NO MESSAGE AND NOTHING NOTED IN CHART. PLEASE CALL BACK IF NEEDED. DID CONFIRM APPT FOR 5-13-25

## 2025-05-13 ENCOUNTER — OFFICE VISIT (OUTPATIENT)
Dept: PAIN MEDICINE | Facility: CLINIC | Age: 85
End: 2025-05-13
Payer: MEDICARE

## 2025-05-13 VITALS
WEIGHT: 211 LBS | DIASTOLIC BLOOD PRESSURE: 78 MMHG | HEIGHT: 70 IN | OXYGEN SATURATION: 93 % | HEART RATE: 77 BPM | TEMPERATURE: 97.9 F | BODY MASS INDEX: 30.21 KG/M2 | RESPIRATION RATE: 18 BRPM | SYSTOLIC BLOOD PRESSURE: 131 MMHG

## 2025-05-13 DIAGNOSIS — M25.551 RIGHT HIP PAIN: ICD-10-CM

## 2025-05-13 DIAGNOSIS — M96.1 POSTLAMINECTOMY SYNDROME, CERVICAL REGION: ICD-10-CM

## 2025-05-13 DIAGNOSIS — M54.16 LUMBAR RADICULOPATHY: ICD-10-CM

## 2025-05-13 DIAGNOSIS — Z79.899 ENCOUNTER FOR LONG-TERM (CURRENT) USE OF HIGH-RISK MEDICATION: ICD-10-CM

## 2025-05-13 DIAGNOSIS — M96.1 POSTLAMINECTOMY SYNDROME, CERVICAL REGION: Primary | ICD-10-CM

## 2025-05-13 DIAGNOSIS — G89.3 CANCER RELATED PAIN: ICD-10-CM

## 2025-05-13 DIAGNOSIS — M79.2 NEUROPATHIC PAIN OF CHEST: ICD-10-CM

## 2025-05-13 LAB
POC AMPHETAMINES: NEGATIVE
POC BARBITURATES: NEGATIVE
POC BENZODIAZEPHINES: NEGATIVE
POC BUPRENORPHINE: NEGATIVE
POC COCAINE: NEGATIVE
POC METHADONE: NEGATIVE
POC METHAMPHETAMINE SCREEN URINE: NEGATIVE
POC OPIATES: NEGATIVE
POC OXYCODONE: POSITIVE
POC PHENCYCLIDINE: NEGATIVE
POC PROPOXYPHENE: NEGATIVE
POC THC: NEGATIVE
POC TRICYCLIC ANTIDEPRESSANTS: NEGATIVE

## 2025-05-13 PROCEDURE — 1125F AMNT PAIN NOTED PAIN PRSNT: CPT | Performed by: NURSE PRACTITIONER

## 2025-05-13 PROCEDURE — 80305 DRUG TEST PRSMV DIR OPT OBS: CPT | Performed by: NURSE PRACTITIONER

## 2025-05-13 PROCEDURE — 99214 OFFICE O/P EST MOD 30 MIN: CPT | Performed by: NURSE PRACTITIONER

## 2025-05-13 PROCEDURE — 1159F MED LIST DOCD IN RCRD: CPT | Performed by: NURSE PRACTITIONER

## 2025-05-13 PROCEDURE — 1160F RVW MEDS BY RX/DR IN RCRD: CPT | Performed by: NURSE PRACTITIONER

## 2025-05-13 RX ORDER — OXYCODONE AND ACETAMINOPHEN 10; 325 MG/1; MG/1
1 TABLET ORAL EVERY 4 HOURS PRN
Qty: 180 TABLET | Refills: 0 | Status: SHIPPED | OUTPATIENT
Start: 2025-05-13
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
## 2025-06-13 ENCOUNTER — OFFICE VISIT (OUTPATIENT)
Dept: PAIN MEDICINE | Facility: CLINIC | Age: 85
End: 2025-06-13
Payer: COMMERCIAL

## 2025-06-13 VITALS
HEART RATE: 85 BPM | SYSTOLIC BLOOD PRESSURE: 129 MMHG | BODY MASS INDEX: 30.78 KG/M2 | OXYGEN SATURATION: 96 % | RESPIRATION RATE: 20 BRPM | TEMPERATURE: 96.6 F | HEIGHT: 70 IN | WEIGHT: 215 LBS | DIASTOLIC BLOOD PRESSURE: 74 MMHG

## 2025-06-13 DIAGNOSIS — M96.1 POSTLAMINECTOMY SYNDROME, CERVICAL REGION: Primary | ICD-10-CM

## 2025-06-13 DIAGNOSIS — G89.3 CANCER RELATED PAIN: ICD-10-CM

## 2025-06-13 DIAGNOSIS — Z79.899 ENCOUNTER FOR LONG-TERM (CURRENT) USE OF HIGH-RISK MEDICATION: ICD-10-CM

## 2025-06-13 DIAGNOSIS — M79.2 NEUROPATHIC PAIN OF CHEST: ICD-10-CM

## 2025-06-13 DIAGNOSIS — G89.29 CHRONIC PAIN OF RIGHT ANKLE: ICD-10-CM

## 2025-06-13 DIAGNOSIS — M25.571 CHRONIC PAIN OF RIGHT ANKLE: ICD-10-CM

## 2025-06-13 DIAGNOSIS — M96.1 POSTLAMINECTOMY SYNDROME, CERVICAL REGION: ICD-10-CM

## 2025-06-13 DIAGNOSIS — M54.16 LUMBAR RADICULOPATHY: ICD-10-CM

## 2025-06-13 RX ORDER — OXYCODONE AND ACETAMINOPHEN 10; 325 MG/1; MG/1
1 TABLET ORAL EVERY 4 HOURS PRN
Qty: 180 TABLET | Refills: 0 | Status: SHIPPED | OUTPATIENT
Start: 2025-06-13
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
## 2025-06-20 ENCOUNTER — TELEPHONE (OUTPATIENT)
Dept: ORTHOPEDIC SURGERY | Facility: CLINIC | Age: 85
End: 2025-06-20
Payer: MEDICARE

## 2025-06-20 NOTE — TELEPHONE ENCOUNTER
Caller Name: Jose Reynoso      Relationship: Self      Best Contact Number: [phone]      Patient is requesting samples of STEROID       How many days of medication do you have left? NONE        Additional Information:PATIENT STATES AT HIS LAST VISIT DR. MCNEILL SAID HE WOULD CALL IN SOME STEROID MEDICATION TO ASSIST WITH PAIN. PATIENT WANTED TO PULL HIS FOLLOW UP APPOINTMENT SOONER AS WELL DUE TO THE PAIN HE IS EXPERIENCING. PLEASE NOTIFY THE PATIENT ONCE MEDICATION HAS BEEN SENT TO THE [facility] IF POSSIBLE.

## 2025-06-23 RX ORDER — METHYLPREDNISOLONE 4 MG/1
TABLET ORAL
Qty: 1 EACH | Refills: 0 | Status: SHIPPED | OUTPATIENT
Start: 2025-06-23

## 2025-07-08 ENCOUNTER — OFFICE VISIT (OUTPATIENT)
Dept: ORTHOPEDIC SURGERY | Facility: CLINIC | Age: 85
End: 2025-07-08
Payer: MEDICARE

## 2025-07-08 VITALS — BODY MASS INDEX: 30.78 KG/M2 | HEIGHT: 70 IN | WEIGHT: 215 LBS

## 2025-07-08 DIAGNOSIS — Z98.890 HISTORY OF OPEN REDUCTION AND INTERNAL FIXATION (ORIF) PROCEDURE: Primary | ICD-10-CM

## 2025-07-08 NOTE — PROGRESS NOTES
Subjective:     Patient ID: Jose Reynoso is a 84 y.o. male.    Chief Complaint:    History of Present Illness  Jose Reynoso returns to clinic today for evaluation of right ankle status post ORIF performed by myself 4.5 months ago.  He is no longer attending physical therapy and progressing well.  The patient denies any fevers chills or drainage from their surgical incision. He is happy with the result so far. He states that the pain and swelling are improving but he still has a difficult time at night.  He states that the ankle hurts all the way from his big toe to his hip and his low back.     Social History     Occupational History    Not on file   Tobacco Use    Smoking status: Former     Current packs/day: 0.00     Average packs/day: 1 pack/day for 36.0 years (36.0 ttl pk-yrs)     Types: Cigarettes     Start date: 1951     Quit date: 1987     Years since quittin.5     Passive exposure: Past    Smokeless tobacco: Never   Vaping Use    Vaping status: Never Used   Substance and Sexual Activity    Alcohol use: No    Drug use: No    Sexual activity: Not Currently     Partners: Female      Past Medical History:   Diagnosis Date    Arthritis of back     Cancer     left lung dx     Cervical disc disorder     Cholelithiasis     Removed    Chronic pain disorder     Neck, back, chest, legs    Colon polyp     COPD (chronic obstructive pulmonary disease)     Extremity pain     Fracture of ankle 219    Fracture, foot 711231    Fractures 1983    Back    GERD (gastroesophageal reflux disease)     Headache     Hernia     Injury of back     Joint pain Hip    Low back pain     Lumbosacral disc disease     Neck pain     Peripheral neuropathy     Shingles     Sleep apnea      Past Surgical History:   Procedure Laterality Date    ANKLE OPEN REDUCTION INTERNAL FIXATION Right 2025    Procedure: ANKLE OPEN REDUCTION INTERNAL FIXATION;  Surgeon: Ernie Ying MD;  Location: [facility];  Service:  "Orthopedics;  Laterality: Right;    APPENDECTOMY      CERVICAL FUSION      CHOLECYSTECTOMY      COLONOSCOPY N/A 03/23/2018    Procedure: COLONOSCOPY with polypectomy;  Surgeon: Ja Lopez MD;  Location:  [facility];  Service: Gastroenterology    COLONOSCOPY W/ POLYPECTOMY N/A 05/01/2023    Procedure: COLONOSCOPY WITH POLYPECTOMY;  Surgeon: Ja Lopez MD;  Location:  [facility];  Service: Gastroenterology;  Laterality: N/A;  Diverticulosis; Sigmoid polyp x 4; Rectal polyp x 2    ENDOSCOPY N/A 07/05/2019    Procedure: ESOPHAGOGASTRODUODENOSCOPY w/ biopsies;  Surgeon: Ja Lopez MD;  Location:  [facility];  Service: Gastroenterology    EPIDURAL BLOCK      FOOT SURGERY  [id number]    LAMINECTOMY      NECK SURGERY      SPINAL FUSION      SPINE SURGERY      STEROID INJECTION HIP Right 09/19/2024    Procedure: right hip injection 20610;  Surgeon: Reid Jackson MD;  Location: [facility];  Service: Pain Management;  Laterality: Right;    STEROID INJECTION HIP Right 01/28/2025    Procedure: right hip injection 20610;  Surgeon: Reid Jackson MD;  Location: [facility];  Service: Pain Management;  Laterality: Right;    THORACIC EPIDURAL N/A 11/12/2024    Procedure: thoracic epidural steroid injection at approximately T7/8 97929;  Surgeon: Reid Jackson MD;  Location: [facility];  Service: Pain Management;  Laterality: N/A;    UPPER GASTROINTESTINAL ENDOSCOPY         Family History   Problem Relation Age of Onset    Colon cancer Neg Hx     Colon polyps Neg Hx                  Objective:  Vitals:    07/08/25 1300   Weight: 97.5 kg (215 lb)   Height: 177.8 cm (70\")         07/08/25  1300   Weight: 97.5 kg (215 lb)     Body mass index is 30.85 kg/m².           Right Ankle Exam     Tenderness   The patient is experiencing no tenderness.  Swelling: mild    Range of Motion   Dorsiflexion:  normal   Plantar flexion:  normal   Eversion:  normal   Inversion:  normal "     Muscle Strength   Dorsiflexion:  5/5  Plantar flexion:  5/5  Anterior tibial:  5/5  Posterior tibial:  5/5  Gastrocsoleus:  5/5  Peroneal muscle:  5/5    Other   Erythema: absent  Scars: present  Sensation: normal  Pulse: present                Imaging: 3 views of the right ankle were ordered and reviewed by myself in the office today  Indication: Right ankle fracture  Findings: X-rays demonstrate a right trimalleolar ankle fracture status post ORIF with implants in expected position.  There is no signs of failure fixation or loss of reduction.  Ankle spaces appear intact and concentrically reduced. Fractures are healed  Comparative studies:  x-rays at last visit    Assessment:        1. History of open reduction and internal fixation (ORIF) procedure           Plan:          Discussed treatment options at length with patient at today's visit. I discussed with the patient that they are doing well from my standpoint.  I am happy with the progress that they have made.  They are ambulatory today with no assistive device and no limp or residual deficits.  I would like them to continue to work on range of motion and strengthening exercises.   The patient has no restrictions from my standpoint.  The patient's surgical incision is healed without signs of infection.  I would like the patient to notify our office immediately if they note any increasing redness, pain, fevers, chills, or drainage of any kind from their surgical incision as this would be abnormal at this point in time.  I discussed with the patient that at this point in time we do not need to see them back for another follow-up appointment.  I did discuss however that I am happy to see the patient at any point if issues questions or concerns arise.  The patient voiced understanding and agreement with the plan.  I discussed with the patient if he is having neuropathy like symptoms he could try gabapentin or Lyrica [facility].  I offered the  patient a topical compounding cream but he declined at this time.  Follow up: as needed      Jose M Edgardo was in agreement with plan and had all questions answered.     Medications:  No orders of the defined types were placed in this encounter.      Followup:  No follow-ups on file.    Diagnoses and all orders for this visit:    1. History of open reduction and internal fixation (ORIF) procedure (Primary)  -     XR Ankle 3+ View Right          Dictated utilizing Dragon dictation

## 2025-07-11 ENCOUNTER — OFFICE VISIT (OUTPATIENT)
Dept: PAIN MEDICINE | Facility: CLINIC | Age: 85
End: 2025-07-11
Payer: MEDICARE

## 2025-07-11 VITALS
HEIGHT: 70 IN | DIASTOLIC BLOOD PRESSURE: 74 MMHG | WEIGHT: 209.4 LBS | BODY MASS INDEX: 29.98 KG/M2 | RESPIRATION RATE: 20 BRPM | OXYGEN SATURATION: 94 % | SYSTOLIC BLOOD PRESSURE: 120 MMHG | TEMPERATURE: 97.1 F | HEART RATE: 86 BPM

## 2025-07-11 DIAGNOSIS — Z79.899 ENCOUNTER FOR LONG-TERM (CURRENT) USE OF HIGH-RISK MEDICATION: ICD-10-CM

## 2025-07-11 DIAGNOSIS — M96.1 POSTLAMINECTOMY SYNDROME, CERVICAL REGION: Primary | ICD-10-CM

## 2025-07-11 DIAGNOSIS — M79.2 NEUROPATHIC PAIN OF CHEST: ICD-10-CM

## 2025-07-11 DIAGNOSIS — G89.29 CHRONIC PAIN OF RIGHT ANKLE: ICD-10-CM

## 2025-07-11 DIAGNOSIS — G89.3 CANCER RELATED PAIN: ICD-10-CM

## 2025-07-11 DIAGNOSIS — M96.1 POSTLAMINECTOMY SYNDROME, CERVICAL REGION: ICD-10-CM

## 2025-07-11 DIAGNOSIS — M25.571 CHRONIC PAIN OF RIGHT ANKLE: ICD-10-CM

## 2025-07-11 DIAGNOSIS — M54.16 LUMBAR RADICULOPATHY: ICD-10-CM

## 2025-07-11 RX ORDER — OXYCODONE AND ACETAMINOPHEN 10; 325 MG/1; MG/1
1 TABLET ORAL EVERY 4 HOURS PRN
Qty: 180 TABLET | Refills: 0 | Status: SHIPPED | OUTPATIENT
Start: 2025-07-11
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
## 2025-08-11 ENCOUNTER — OFFICE VISIT (OUTPATIENT)
Dept: PAIN MEDICINE | Facility: CLINIC | Age: 85
End: 2025-08-11
Payer: MEDICARE

## 2025-08-11 VITALS
SYSTOLIC BLOOD PRESSURE: 102 MMHG | HEIGHT: 70 IN | HEART RATE: 79 BPM | RESPIRATION RATE: 18 BRPM | BODY MASS INDEX: 30.06 KG/M2 | TEMPERATURE: 97.6 F | OXYGEN SATURATION: 94 % | DIASTOLIC BLOOD PRESSURE: 69 MMHG | WEIGHT: 210 LBS

## 2025-08-11 DIAGNOSIS — G89.3 CANCER RELATED PAIN: ICD-10-CM

## 2025-08-11 DIAGNOSIS — G89.29 CHRONIC PAIN OF RIGHT ANKLE: ICD-10-CM

## 2025-08-11 DIAGNOSIS — Z79.899 ENCOUNTER FOR LONG-TERM (CURRENT) USE OF HIGH-RISK MEDICATION: ICD-10-CM

## 2025-08-11 DIAGNOSIS — M54.16 LUMBAR RADICULOPATHY: ICD-10-CM

## 2025-08-11 DIAGNOSIS — M25.551 RIGHT HIP PAIN: ICD-10-CM

## 2025-08-11 DIAGNOSIS — M96.1 POSTLAMINECTOMY SYNDROME, CERVICAL REGION: Primary | ICD-10-CM

## 2025-08-11 DIAGNOSIS — M96.1 POSTLAMINECTOMY SYNDROME, CERVICAL REGION: ICD-10-CM

## 2025-08-11 DIAGNOSIS — M25.571 CHRONIC PAIN OF RIGHT ANKLE: ICD-10-CM

## 2025-08-11 PROCEDURE — 1159F MED LIST DOCD IN RCRD: CPT | Performed by: NURSE PRACTITIONER

## 2025-08-11 PROCEDURE — G2211 COMPLEX E/M VISIT ADD ON: HCPCS | Performed by: NURSE PRACTITIONER

## 2025-08-11 PROCEDURE — 99214 OFFICE O/P EST MOD 30 MIN: CPT | Performed by: NURSE PRACTITIONER

## 2025-08-11 PROCEDURE — 1160F RVW MEDS BY RX/DR IN RCRD: CPT | Performed by: NURSE PRACTITIONER

## 2025-08-11 PROCEDURE — 1125F AMNT PAIN NOTED PAIN PRSNT: CPT | Performed by: NURSE PRACTITIONER

## 2025-08-11 RX ORDER — OXYCODONE AND ACETAMINOPHEN 10; 325 MG/1; MG/1
1 TABLET ORAL EVERY 4 HOURS PRN
Qty: 180 TABLET | Refills: 0 | Status: SHIPPED | OUTPATIENT
Start: 2025-08-11

## (undated) DEVICE — DRSNG PAD ABD 8X10IN STRL

## (undated) DEVICE — SOL ISO/ALC RUB 70PCT 4OZ

## (undated) DEVICE — DRP SURG U/DRP INVISISHIELD PA 48X52IN

## (undated) DEVICE — DISPOSABLE TOURNIQUET CUFF 34"X4", 1-LINE, BLUE, STERILE, 1EA/PK, 10PK/CS: Brand: ASP MEDICAL

## (undated) DEVICE — BIT DRL QC SS 2.5X110MM

## (undated) DEVICE — SCRW PERIART S/TAP HD/2.7MM 3.5X22MM
Type: IMPLANTABLE DEVICE | Site: ANKLE | Status: NON-FUNCTIONAL
Removed: 2025-02-19

## (undated) DEVICE — BANDAGE,ELASTIC,ESMARK,STERILE,6"X9',LF: Brand: MEDLINE

## (undated) DEVICE — SYS IRR SURGIPHOR A/MIC RTU BO PVPI 450ML STRL

## (undated) DEVICE — GLV SURG SENSICARE PI LF PF 7.5

## (undated) DEVICE — PADDING,UNDERCAST,COTTON, 4"X4YD STERILE: Brand: MEDLINE

## (undated) DEVICE — BIT DRL QC SS 2.7X100MM

## (undated) DEVICE — BIT DRL QC SS 3.5X110MM

## (undated) DEVICE — DRP C/ARM 41X74IN

## (undated) DEVICE — GLV SURG SENSICARE PI LF PF 8 GRN STRL

## (undated) DEVICE — BIT DRL QC SS 2.0X100MM

## (undated) DEVICE — PCH INST SURG INVISISHIELD 2PCKT

## (undated) DEVICE — SOL IRR H2O BO 1000ML STRL

## (undated) DEVICE — BNDG COBAN S/ADHR WRP LF 6IN 5YD TN

## (undated) DEVICE — NDL SPINE 22G 5IN BLK

## (undated) DEVICE — BNDG,ELSTC,MATRIX,STRL,6"X5YD,LF,HOOK&LP: Brand: MEDLINE

## (undated) DEVICE — BNDG,ELSTC,MATRIX,STRL,4"X5YD,LF,HOOK&LP: Brand: MEDLINE

## (undated) DEVICE — TB SXN FRAZIER 10F STRL

## (undated) DEVICE — SPNG GZ WOVN 4X4IN 12PLY 10/BX STRL

## (undated) DEVICE — SUT ETHLN 3/0 FS1 663G

## (undated) DEVICE — EPIDURAL TRAY: Brand: MEDLINE INDUSTRIES, INC.

## (undated) DEVICE — INTENDED FOR TISSUE SEPARATION, AND OTHER PROCEDURES THAT REQUIRE A SHARP SURGICAL BLADE TO PUNCTURE OR CUT.: Brand: BARD-PARKER ® STAINLESS STEEL BLADES

## (undated) DEVICE — WEBRIL* CAST PADDING: Brand: DEROYAL

## (undated) DEVICE — KWIRE ZPS TROC PT 1.6X150MM NS
Type: IMPLANTABLE DEVICE | Site: ANKLE | Status: NON-FUNCTIONAL
Removed: 2025-02-19

## (undated) DEVICE — PK BASIC ORTHO 90

## (undated) DEVICE — CANN NASL O2 INF

## (undated) DEVICE — DRP C/ARMOR

## (undated) DEVICE — SUT MNCRYL 2/0 SH 27IN Y417H

## (undated) DEVICE — GLV SURG TRIUMPH PF LTX 7.5 STRL